# Patient Record
Sex: MALE | URBAN - METROPOLITAN AREA
[De-identification: names, ages, dates, MRNs, and addresses within clinical notes are randomized per-mention and may not be internally consistent; named-entity substitution may affect disease eponyms.]

---

## 2023-06-09 ENCOUNTER — EMPLOYEE HEALTH (OUTPATIENT)
Dept: OTHER | Facility: HOSPITAL | Age: 23
End: 2023-06-09
Attending: PREVENTIVE MEDICINE

## 2023-06-09 DIAGNOSIS — Z00.00 WELLNESS EXAMINATION: Primary | ICD-10-CM

## 2023-06-09 PROCEDURE — 86480 TB TEST CELL IMMUN MEASURE: CPT

## 2023-06-13 LAB
M TB IFN-G CD4+ T-CELLS BLD-ACNC: 0.01 IU/ML
M TB TUBERC IFN-G BLD QL: NEGATIVE
M TB TUBERC IGNF/MITOGEN IGNF CONTROL: >10 IU/ML
QFT TB1 AG MINUS NIL: 0 IU/ML
QFT TB2 AG MINUS NIL: 0 IU/ML

## 2023-09-15 PROBLEM — F41.1 GAD (GENERALIZED ANXIETY DISORDER): Status: ACTIVE | Noted: 2023-09-15

## 2023-09-15 PROBLEM — Z87.890: Status: ACTIVE | Noted: 2023-09-15

## 2023-09-15 PROBLEM — Z82.79 FAMILY HISTORY OF CONGENITAL HEART DISEASE IN SISTER: Status: ACTIVE | Noted: 2023-09-15

## 2023-09-18 ENCOUNTER — TELEPHONE (OUTPATIENT)
Dept: INTERNAL MEDICINE CLINIC | Facility: CLINIC | Age: 23
End: 2023-09-18

## 2023-09-20 ENCOUNTER — TELEPHONE (OUTPATIENT)
Dept: INTERNAL MEDICINE CLINIC | Facility: CLINIC | Age: 23
End: 2023-09-20

## 2023-09-20 DIAGNOSIS — Z13.0 SCREENING FOR DISORDER OF BLOOD AND BLOOD-FORMING ORGANS: ICD-10-CM

## 2023-09-20 DIAGNOSIS — Z13.228 SCREENING FOR METABOLIC DISORDER: ICD-10-CM

## 2023-09-20 DIAGNOSIS — Z00.00 ROUTINE GENERAL MEDICAL EXAMINATION AT A HEALTH CARE FACILITY: Primary | ICD-10-CM

## 2023-09-20 DIAGNOSIS — Z13.220 SCREENING FOR LIPID DISORDERS: ICD-10-CM

## 2023-09-20 DIAGNOSIS — Z13.29 SCREENING FOR THYROID DISORDER: ICD-10-CM

## 2023-09-20 NOTE — TELEPHONE ENCOUNTER
Future Appointments   Date Time Provider Elvis Alla   10/18/2023 11:30 AM Sarwat Caldwell PA-C [facility]     Informed must fast no call back required.  Orders to Bakari Albarran

## 2023-10-18 ENCOUNTER — OFFICE VISIT (OUTPATIENT)
Dept: INTERNAL MEDICINE CLINIC | Facility: CLINIC | Age: 23
End: 2023-10-18
Payer: COMMERCIAL

## 2023-10-18 ENCOUNTER — OFFICE VISIT (OUTPATIENT)
Facility: CLINIC | Age: 23
End: 2023-10-18
Payer: COMMERCIAL

## 2023-10-18 VITALS
BODY MASS INDEX: 27.49 KG/M2 | HEART RATE: 73 BPM | SYSTOLIC BLOOD PRESSURE: 102 MMHG | DIASTOLIC BLOOD PRESSURE: 60 MMHG | WEIGHT: 165 LBS | OXYGEN SATURATION: 98 % | HEIGHT: 65 IN | RESPIRATION RATE: 18 BRPM

## 2023-10-18 VITALS
RESPIRATION RATE: 16 BRPM | BODY MASS INDEX: 26.33 KG/M2 | OXYGEN SATURATION: 100 % | HEART RATE: 70 BPM | WEIGHT: 158 LBS | HEIGHT: 65 IN | DIASTOLIC BLOOD PRESSURE: 64 MMHG | SYSTOLIC BLOOD PRESSURE: 116 MMHG

## 2023-10-18 DIAGNOSIS — L65.9 HAIR LOSS: ICD-10-CM

## 2023-10-18 DIAGNOSIS — F41.1 GAD (GENERALIZED ANXIETY DISORDER): ICD-10-CM

## 2023-10-18 DIAGNOSIS — Z87.890 TRANSGENDER WITH HISTORY OF GENDER AFFIRMATION SURGERY: Primary | ICD-10-CM

## 2023-10-18 DIAGNOSIS — Z12.4 CERVICAL CANCER SCREENING: ICD-10-CM

## 2023-10-18 DIAGNOSIS — R06.83 SNORING: ICD-10-CM

## 2023-10-18 DIAGNOSIS — Z11.3 ROUTINE SCREENING FOR STI (SEXUALLY TRANSMITTED INFECTION): ICD-10-CM

## 2023-10-18 DIAGNOSIS — M54.2 NECK PAIN: ICD-10-CM

## 2023-10-18 DIAGNOSIS — M54.9 UPPER BACK PAIN: ICD-10-CM

## 2023-10-18 DIAGNOSIS — Z00.00 ANNUAL PHYSICAL EXAM: Primary | ICD-10-CM

## 2023-10-18 PROCEDURE — 87591 N.GONORRHOEAE DNA AMP PROB: CPT | Performed by: PHYSICIAN ASSISTANT

## 2023-10-18 PROCEDURE — 87491 CHLMYD TRACH DNA AMP PROBE: CPT | Performed by: PHYSICIAN ASSISTANT

## 2023-10-18 PROCEDURE — 3008F BODY MASS INDEX DOCD: CPT | Performed by: PHYSICIAN ASSISTANT

## 2023-10-18 PROCEDURE — 3008F BODY MASS INDEX DOCD: CPT | Performed by: STUDENT IN AN ORGANIZED HEALTH CARE EDUCATION/TRAINING PROGRAM

## 2023-10-18 PROCEDURE — 3074F SYST BP LT 130 MM HG: CPT | Performed by: PHYSICIAN ASSISTANT

## 2023-10-18 PROCEDURE — 3074F SYST BP LT 130 MM HG: CPT | Performed by: STUDENT IN AN ORGANIZED HEALTH CARE EDUCATION/TRAINING PROGRAM

## 2023-10-18 PROCEDURE — 3078F DIAST BP <80 MM HG: CPT | Performed by: STUDENT IN AN ORGANIZED HEALTH CARE EDUCATION/TRAINING PROGRAM

## 2023-10-18 PROCEDURE — 99204 OFFICE O/P NEW MOD 45 MIN: CPT | Performed by: STUDENT IN AN ORGANIZED HEALTH CARE EDUCATION/TRAINING PROGRAM

## 2023-10-18 PROCEDURE — 88175 CYTOPATH C/V AUTO FLUID REDO: CPT | Performed by: PHYSICIAN ASSISTANT

## 2023-10-18 PROCEDURE — 3078F DIAST BP <80 MM HG: CPT | Performed by: PHYSICIAN ASSISTANT

## 2023-10-18 PROCEDURE — 99395 PREV VISIT EST AGE 18-39: CPT | Performed by: PHYSICIAN ASSISTANT

## 2023-10-18 RX ORDER — ESCITALOPRAM OXALATE 5 MG/1
5 TABLET ORAL DAILY
Qty: 90 TABLET | Refills: 0 | Status: SHIPPED | OUTPATIENT
Start: 2023-10-18

## 2023-10-18 RX ORDER — TESTOSTERONE CYPIONATE 200 MG/ML
INJECTION, SOLUTION INTRAMUSCULAR
Qty: 10 ML | Refills: 3 | Status: SHIPPED | OUTPATIENT
Start: 2023-10-18

## 2023-10-18 NOTE — PATIENT INSTRUCTIONS
Please get your blood work done for me 8AM and fasting on Wednesday (assisted between your injections). I refilled your testosterone, please continue the same dose for now and let me know if they give you any issues at the pharmacy. Depending on your blood work, I'll let you know if we need to change anything. Whenever you want to discuss bottom surgery let me know, and I will look into referring you to a more experienced center.      Return Visit   [X] Physician in 6 months  [  ] After visit summary   [X] Fasting/8AM labs  [  ] Central scheduling # for ultrasound/nuclear med/CT/MRI/DXA  [  ] Directions to 1st floor lab to collect urine collection jug/salivary cortisol tubes  [  ] Med rep info for:  [  ] Dental clearance form  [  ] Will need authorization for outside records  [  ] Give blood sugar log  [  ] Other:

## 2023-10-19 LAB
C TRACH DNA SPEC QL NAA+PROBE: NEGATIVE
N GONORRHOEA DNA SPEC QL NAA+PROBE: NEGATIVE

## 2023-10-24 LAB
.: NORMAL
.: NORMAL

## 2023-11-01 ENCOUNTER — LAB ENCOUNTER (OUTPATIENT)
Dept: LAB | Age: 23
End: 2023-11-01
Attending: PHYSICIAN ASSISTANT
Payer: COMMERCIAL

## 2023-11-01 DIAGNOSIS — R79.89 ABNORMAL TSH: Primary | ICD-10-CM

## 2023-11-01 DIAGNOSIS — Z00.00 ROUTINE GENERAL MEDICAL EXAMINATION AT A HEALTH CARE FACILITY: ICD-10-CM

## 2023-11-01 DIAGNOSIS — Z13.29 SCREENING FOR THYROID DISORDER: ICD-10-CM

## 2023-11-01 DIAGNOSIS — Z87.890 TRANSGENDER WITH HISTORY OF GENDER AFFIRMATION SURGERY: ICD-10-CM

## 2023-11-01 DIAGNOSIS — Z13.220 SCREENING FOR LIPID DISORDERS: ICD-10-CM

## 2023-11-01 DIAGNOSIS — Z13.0 SCREENING FOR DISORDER OF BLOOD AND BLOOD-FORMING ORGANS: ICD-10-CM

## 2023-11-01 DIAGNOSIS — R79.89 ABNORMAL TSH: ICD-10-CM

## 2023-11-01 DIAGNOSIS — Z13.228 SCREENING FOR METABOLIC DISORDER: ICD-10-CM

## 2023-11-01 LAB
ALBUMIN SERPL-MCNC: 4.3 G/DL (ref 3.4–5)
ALBUMIN/GLOB SERPL: 1.2 {RATIO} (ref 1–2)
ALP LIVER SERPL-CCNC: 54 U/L
ALT SERPL-CCNC: 31 U/L
ANION GAP SERPL CALC-SCNC: 6 MMOL/L (ref 0–18)
AST SERPL-CCNC: 17 U/L (ref 15–37)
BASOPHILS # BLD AUTO: 0.06 X10(3) UL (ref 0–0.2)
BASOPHILS NFR BLD AUTO: 1.2 %
BILIRUB SERPL-MCNC: 0.7 MG/DL (ref 0.1–2)
BUN BLD-MCNC: 12 MG/DL (ref 9–23)
CALCIUM BLD-MCNC: 9.3 MG/DL (ref 8.5–10.1)
CHLORIDE SERPL-SCNC: 105 MMOL/L (ref 98–112)
CHOLEST SERPL-MCNC: 130 MG/DL (ref ?–200)
CO2 SERPL-SCNC: 28 MMOL/L (ref 21–32)
CREAT BLD-MCNC: 1.09 MG/DL
EGFRCR SERPLBLD CKD-EPI 2021: 97 ML/MIN/1.73M2 (ref 60–?)
EOSINOPHIL # BLD AUTO: 0.13 X10(3) UL (ref 0–0.7)
EOSINOPHIL NFR BLD AUTO: 2.7 %
ERYTHROCYTE [DISTWIDTH] IN BLOOD BY AUTOMATED COUNT: 12.1 %
FASTING PATIENT LIPID ANSWER: YES
FASTING STATUS PATIENT QL REPORTED: YES
GLOBULIN PLAS-MCNC: 3.5 G/DL (ref 2.8–4.4)
GLUCOSE BLD-MCNC: 89 MG/DL (ref 70–99)
HCT VFR BLD AUTO: 46.4 %
HDLC SERPL-MCNC: 40 MG/DL (ref 40–59)
HGB BLD-MCNC: 15.3 G/DL
IMM GRANULOCYTES # BLD AUTO: 0.01 X10(3) UL (ref 0–1)
IMM GRANULOCYTES NFR BLD: 0.2 %
LDLC SERPL CALC-MCNC: 76 MG/DL (ref ?–100)
LYMPHOCYTES # BLD AUTO: 1.52 X10(3) UL (ref 1–4)
LYMPHOCYTES NFR BLD AUTO: 31.4 %
MCH RBC QN AUTO: 30.7 PG (ref 26–34)
MCHC RBC AUTO-ENTMCNC: 33 G/DL (ref 31–37)
MCV RBC AUTO: 93 FL
MONOCYTES # BLD AUTO: 0.37 X10(3) UL (ref 0.1–1)
MONOCYTES NFR BLD AUTO: 7.6 %
NEUTROPHILS # BLD AUTO: 2.75 X10 (3) UL (ref 1.5–7.7)
NEUTROPHILS # BLD AUTO: 2.75 X10(3) UL (ref 1.5–7.7)
NEUTROPHILS NFR BLD AUTO: 56.9 %
NONHDLC SERPL-MCNC: 90 MG/DL (ref ?–130)
OSMOLALITY SERPL CALC.SUM OF ELEC: 287 MOSM/KG (ref 275–295)
PLATELET # BLD AUTO: 255 10(3)UL (ref 150–450)
POTASSIUM SERPL-SCNC: 4.3 MMOL/L (ref 3.5–5.1)
PROT SERPL-MCNC: 7.8 G/DL (ref 6.4–8.2)
RBC # BLD AUTO: 4.99 X10(6)UL
SODIUM SERPL-SCNC: 139 MMOL/L (ref 136–145)
T3 SERPL-MCNC: 94 NG/DL (ref 60–181)
T3FREE SERPL-MCNC: 3 PG/ML (ref 2.4–4.2)
T4 FREE SERPL-MCNC: 0.7 NG/DL (ref 0.8–1.7)
TESTOST SERPL-MCNC: 592.3 NG/DL
THYROPEROXIDASE AB SERPL-ACNC: 732 U/ML (ref ?–60)
TRIGL SERPL-MCNC: 68 MG/DL (ref 30–149)
TSI SER-ACNC: 5.45 MIU/ML (ref 0.36–3.74)
VLDLC SERPL CALC-MCNC: 11 MG/DL (ref 0–30)
WBC # BLD AUTO: 4.8 X10(3) UL (ref 4–11)

## 2023-11-01 PROCEDURE — 85025 COMPLETE CBC W/AUTO DIFF WBC: CPT

## 2023-11-01 PROCEDURE — 84439 ASSAY OF FREE THYROXINE: CPT

## 2023-11-01 PROCEDURE — 84443 ASSAY THYROID STIM HORMONE: CPT

## 2023-11-01 PROCEDURE — 84481 FREE ASSAY (FT-3): CPT

## 2023-11-01 PROCEDURE — 36415 COLL VENOUS BLD VENIPUNCTURE: CPT

## 2023-11-01 PROCEDURE — 80053 COMPREHEN METABOLIC PANEL: CPT

## 2023-11-01 PROCEDURE — 80061 LIPID PANEL: CPT

## 2023-11-01 PROCEDURE — 86376 MICROSOMAL ANTIBODY EACH: CPT

## 2023-11-01 PROCEDURE — 84480 ASSAY TRIIODOTHYRONINE (T3): CPT

## 2023-11-01 PROCEDURE — 84403 ASSAY OF TOTAL TESTOSTERONE: CPT

## 2023-11-04 ENCOUNTER — PATIENT MESSAGE (OUTPATIENT)
Facility: CLINIC | Age: 23
End: 2023-11-04

## 2023-11-06 NOTE — TELEPHONE ENCOUNTER
From: Scooter Wynn  To: Kimberly Coreas  Sent: 11/4/2023 9:25 AM CDT  Subject: Hypothyroidism and Eloy Richardson Dr. Carley Shore,     I have reviewed the lab results and am wondering if there is a test to determine iodine levels. I cook exclusively with sea salt and am wondering if a change to iodized salt would be a solution to my hypothyroidism?     Thank you very much,   Tracey Espinal

## 2023-11-06 NOTE — TELEPHONE ENCOUNTER
Note from Dr. Alina Cutler: Iodine deficiency is extraordinarily rare in the [place] due to fortification of our food supply with iodine (like breads, cereal, milk) and it generally tends to present with a large goiter, which he does not have. His hypothyroidism is likely related to Hashimoto's due to the positive thyroid peroxidase antibody, which is diagnostic. He can certainly switch to iodized salt, but it is unlikely that he would resolve the hypothyroidism by doing so. We generally do not check iodine blood levels or urine levels in patients who have not been treated with radioactive iodine, particular as he lacks a goiter. My recommendation would still be to start the levothyroxine, but if he wants to hold off on that and try the iodized salt and recheck his levels in about six weeks to see if things improve, we can do that. Mild Hashimoto's itself can also be somewhat waxing and waning so if his levels improve in six weeks, we may not be able to clearly attribute that to increased dietary iodine as it could just be the Hashimoto's calming down for a bit. Please let me know what he'd like to do and we can go from there! [facility] sent to patient with response.

## 2023-11-06 NOTE — TELEPHONE ENCOUNTER
[facility] routed for review.     Component      Latest Ref Rng 11/1/2023   TSH      0.358 - 3.740 mIU/mL 5.450 (H)      Component      Latest Ref Rng 11/1/2023   T4,Free (Direct)      0.8 - 1.7 ng/dL 0.7 (L)    ANTI-THYROPEROXIDASE      <60 U/mL 732 (H)    T3 TOTAL      60 - 181 ng/dL 94

## 2023-11-09 NOTE — TELEPHONE ENCOUNTER
Should be ordered by Dr. Sylvester who manages his testosterone (for which the needles are needed)

## 2023-12-04 ENCOUNTER — TELEPHONE (OUTPATIENT)
Dept: PHYSICAL THERAPY | Facility: HOSPITAL | Age: 23
End: 2023-12-04

## 2023-12-06 ENCOUNTER — OFFICE VISIT (OUTPATIENT)
Dept: PHYSICAL THERAPY | Facility: HOSPITAL | Age: 23
End: 2023-12-06
Attending: PHYSICIAN ASSISTANT
Payer: COMMERCIAL

## 2023-12-06 DIAGNOSIS — M54.2 NECK PAIN: Primary | ICD-10-CM

## 2023-12-06 DIAGNOSIS — M54.9 UPPER BACK PAIN: ICD-10-CM

## 2023-12-06 PROCEDURE — 97110 THERAPEUTIC EXERCISES: CPT

## 2023-12-06 PROCEDURE — 97162 PT EVAL MOD COMPLEX 30 MIN: CPT

## 2023-12-13 ENCOUNTER — OFFICE VISIT (OUTPATIENT)
Dept: PHYSICAL THERAPY | Facility: HOSPITAL | Age: 23
End: 2023-12-13
Attending: PHYSICIAN ASSISTANT
Payer: COMMERCIAL

## 2023-12-13 PROCEDURE — 97140 MANUAL THERAPY 1/> REGIONS: CPT

## 2023-12-13 PROCEDURE — 97110 THERAPEUTIC EXERCISES: CPT

## 2023-12-13 NOTE — PROGRESS NOTES
Diagnosis:   Neck pain; Upper back pain         Referring Provider: Ophelia Ceja  Date of Evaluation:    12/6/23    Precautions:  None Next MD visit:   none scheduled  Date of Surgery: n/a   Insurance Primary/Secondary: [place] / N/A     # Auth Visits: 8 per POC            Subjective: Patient states that he is doing overall better than last week, feels that he has slightly more motion and less pain, but pain and limited mobility still present overall. He has been working on [employer] without issue, also has been using lacrosse ball as trigger pt release on UT and that seems to be helping as well. Pain: 2/10      Objective:   (12/13/23):  PROM shoulder flexion: 139 deg      Assessment: Patient continues to present with fwd tilted shoulder on R compared to L, increased mm tension UT, anterior and posterior shoulder, but no increasing pain to STM and other palpation. Able to progress to light scap and RC strengthening without increased pain, reported mild soreness, CP at end of session to decrease likelihood of DOMS. Added YTB flexion with posterior shoulder recruitment due to good ability to perform in session without increasing pain. Goals: (To be met in 8 visits)   Patient will improve R shoulder flexion AROM to at least 160 deg without pain to improve ability to reach [facility] without restriction. Patient will improve R shoulder ER AROM to at least 85 deg without pain to improve ability to reach behind head and dress without restriction. Patient will improve R shoulder strength to 5/5 throughout to improve ability to lift gallon of milk from fridge without restriction. Patient will report improved ability to sleep on both sides at night. Patient will be IND and compliant in HEP to maintain progress made in PT. Plan: Continue PT with progression as indicated. Date: 12/13/2023  TX#: 2/8 Date:                 TX#: 3/ Date:                 TX#: 4/ Date:                 TX#: 5/ Date:    Tx#: 6/   Therex  - UBE L2 retro x 4 min  - prone scap retract with VAHID ext, 0# x 10  - prone reverse fly VAHID, 0# x 10  - prone row, 4# VAHID 2 x 10  - SL shoulder abd to 85 deg, 2# 2 x 10  - SL ER with towel roll, 2# 2 x 20  - flexion pulleys x 2 min  - standing VAHID YTB ER into OH flexion 2 x 5       Manual   - STM R shoulder  - R shoulder oscillations  - PROM R shoulder flexion, abd, ER, IR  - inferior glides R GHJ, gr II/III       -       CP R shoulder x 10 min (no charge)       HEP: seated shoulder flexion slides, SL ER, doorway pec stretch, scap retract, YTB VAHID ER into OH flexion     Charges: Manual x 1 (20'), Therex x 2 (25')       Total Timed Treatment: 45 min  Total Treatment Time: 55 min

## 2023-12-18 ENCOUNTER — APPOINTMENT (OUTPATIENT)
Dept: PHYSICAL THERAPY | Facility: HOSPITAL | Age: 23
End: 2023-12-18
Attending: PHYSICIAN ASSISTANT
Payer: COMMERCIAL

## 2023-12-19 ENCOUNTER — OFFICE VISIT (OUTPATIENT)
Facility: LOCATION | Age: 23
End: 2023-12-19
Payer: COMMERCIAL

## 2023-12-19 DIAGNOSIS — G47.33 OBSTRUCTIVE SLEEP APNEA SYNDROME: Primary | ICD-10-CM

## 2023-12-19 DIAGNOSIS — J35.1 TONSILLAR HYPERTROPHY: ICD-10-CM

## 2023-12-19 PROCEDURE — 99203 OFFICE O/P NEW LOW 30 MIN: CPT | Performed by: OTOLARYNGOLOGY

## 2023-12-19 NOTE — PROGRESS NOTES
Jenn Willard is a 20year old adult. Chief Complaint   Patient presents with    Snoring     HPI:   He has a history of snoring. Times he will awaken not well rested. He denies nasal obstruction. He is status post adenoidectomy and ear tubes as a child. He denies chronic tonsillitis. He denies allergies. Current Outpatient Medications   Medication Sig Dispense Refill    levothyroxine 75 MCG Oral Tab Take 1 tablet (75 mcg total) by mouth before breakfast. 90 tablet 1    testosterone cypionate 200 mg/mL Intramuscular Solution INJECT 0.30 ML IN THE MUSCLE OR UNDER THE SKIN ONCE A WEEK. 10 mL 3    escitalopram 5 MG Oral Tab Take 1 tablet (5 mg total) by mouth daily. 90 tablet 0    Needle, Disp, 18G X 1-1/2\" Does not apply Misc       MULTIPLE VITAMIN IV Take 1 tablet by mouth daily. History reviewed. No pertinent past medical history. Social History:  Social History     Socioeconomic History    Marital status: Single   Tobacco Use    Smoking status: Never    Smokeless tobacco: Never   Substance and Sexual Activity    Drug use: Not Currently    Sexual activity: Yes      Past Surgical History:   Procedure Laterality Date    MASTECTOMY      bilteral    TONSILLECTOMY           REVIEW OF SYSTEMS:   GENERAL HEALTH: feels well otherwise  GENERAL : denies fever, chills, sweats, weight loss, weight gain  SKIN: denies any unusual skin lesions or rashes  RESPIRATORY: denies shortness of breath with exertion  NEURO: denies headaches    EXAM:   There were no vitals taken for this visit. System Findings Details   Constitutional  Overall appearance - Normal.   Psychiatric  Orientation - Oriented to time, place, person & situation. Appropriate mood and affect.    Head/Face  Facial features -- Normal. Skull - Normal.   Eyes  Pupils equal ,round ,react to light and accomidate   Ears, Nose, Throat, Neck  Ears clear nose mild septal deviation left oral cavity clear oropharynx large +3/4 tonsils with pharyngeal larynx narrowing indirect laryngoscopy does show the base of tongue sits posteriorly neck no masses   Neurological  Memory - Normal. Cranial nerves - Cranial nerves II through XII grossly intact. Lymph Detail  Submental. Submandibular. Anterior cervical. Posterior cervical. Supraclavicular. ASSESSMENT AND PLAN:   1. Obstructive sleep apnea syndrome  Given the chronic snoring and sleeping issues he very well may have sleep apnea. He will undergo a dedicated sleep study and we will speak after the above. We have discussed different forms of treatment for snoring including mandible repositioning devices versus CPAP therapy versus surgery. - At Home Sleep Test - Non-Medicare    2. Tonsillar hypertrophy  He does have large tonsils which are likely contributing to the overall upper airway obstruction. The patient indicates understanding of these issues and agrees to the plan. No follow-ups on file.     Maya Botello MD  12/19/2023  5:58 PM

## 2023-12-20 ENCOUNTER — OFFICE VISIT (OUTPATIENT)
Dept: PHYSICAL THERAPY | Facility: HOSPITAL | Age: 23
End: 2023-12-20
Attending: PHYSICIAN ASSISTANT
Payer: COMMERCIAL

## 2023-12-20 PROCEDURE — 97140 MANUAL THERAPY 1/> REGIONS: CPT

## 2023-12-20 PROCEDURE — 97110 THERAPEUTIC EXERCISES: CPT

## 2023-12-20 NOTE — PROGRESS NOTES
Diagnosis:   Neck pain; Upper back pain         Referring Provider: Dania Nelson  Date of Evaluation:    12/6/23    Precautions:  None Next MD visit:   none scheduled  Date of Surgery: n/a   Insurance Primary/Secondary: [place] / N/A     # Auth Visits: 8 per POC            Subjective: Patient states that his R shoulder is doing better, pain not resolved, but less severe and more mobility, however, since last week, his L shoulder has started hurting more than the R. Not sure what has been causing this, but it is the same pain as the R, into the collarbone kendall with reaching across and fwd reaching in front of him. Has been doing his exercises bilaterally, no other changes, not sure if this is related. Pain: R 1/10; L 2-3/10      Objective:   (12/13/23):  PROM shoulder flexion: 139 deg    (12/20/23): Accessory motion:   Cervical: hypomobile at central and L C4, no radiating pain or reproduction into shoulders or other    Thoracic: hypomobile T1-8 central and VAHID unilateral with local pain reported, T4-5 radiating towards scap VAHID, no recreation of clavicle pain   Clavicle: posterior and inferior glides WNL mild pain with inferior glide on L, kendall if held in inferior glide with shoulder flexion and abd   Palpation: pec major insertion on clavicle very TTP on L and also recreating pain with movement      Assessment: Unable to recreate shoulder or clavicle pain with cervical assessment/mobilization/provocation, thoracic also not recreating, however with notable hypomobility T1-8 central and VAHID unilateral, would be appropriate to address. Clavicle pain possibly related to TTP at pec major insertion, patient to hold on pec major stretch this week and address thoracic mobility, will f/u again next week on VAHID pain. Goals: (To be met in 8 visits)   Patient will improve R shoulder flexion AROM to at least 160 deg without pain to improve ability to reach [facility] without restriction.   Patient will improve R shoulder ER AROM to at least 85 deg without pain to improve ability to reach behind head and dress without restriction. Patient will improve R shoulder strength to 5/5 throughout to improve ability to lift gallon of milk from fridge without restriction. Patient will report improved ability to sleep on both sides at night. Patient will be IND and compliant in HEP to maintain progress made in PT. Plan: Continue PT with progression as indicated. F/u on L-sided pain and addition of thoracic extension   Date: 12/13/2023  TX#: 2/8 Date: 12/20/23               TX#: 3/8 Date:                 TX#: 4/ Date:                 TX#: 5/ Date:    Tx#: 6/   Therex  - UBE L2 retro x 4 min  - prone scap retract with VAHID ext, 0# x 10  - prone reverse fly VAHID, 0# x 10  - prone row, 4# VAHID 2 x 10  - SL shoulder abd to 85 deg, 2# 2 x 10  - SL ER with towel roll, 2# 2 x 20  - flexion pulleys x 2 min  - standing VAHID YTB ER into OH flexion 2 x 5 Therex  - UBE L2 retro x 4 min  - continued assessment as needed and listed   - prone scap retract with VAHID ext, 0# x 10  - prone reverse fly VAIHD, 0# x 10  - thoracic extension over 1/2 foam roll, multiple bouts at 2 levels         Manual   - STM R shoulder  - R shoulder oscillations  - PROM R shoulder flexion, abd, ER, IR  - inferior glides R GHJ, gr II/III Manual   - STM R/L shoulder, L pec major insertion along clavicle   - R/L shoulder oscillations  - PROM R/L shoulder flexion, abd, ER, IR  - inferior glides R/L GHJ, gr II/III  - inferior mobs of L clavicle with AROM L shoulder flexion and add      - -      CP R shoulder x 10 min (no charge) Icing at home      HEP: seated shoulder flexion slides, SL ER, doorway pec stretch (hold as of 12/20/23), scap retract, YTB VAHID ER into OH flexion, thoracic ext over foam roll     Charges: Manual x 1 (25'), Therex x 2 (20')       Total Timed Treatment: 45 min  Total Treatment Time: 55 min

## 2023-12-27 ENCOUNTER — OFFICE VISIT (OUTPATIENT)
Dept: PHYSICAL THERAPY | Facility: HOSPITAL | Age: 23
End: 2023-12-27
Attending: PHYSICIAN ASSISTANT
Payer: COMMERCIAL

## 2023-12-27 PROCEDURE — 97140 MANUAL THERAPY 1/> REGIONS: CPT

## 2023-12-27 PROCEDURE — 97110 THERAPEUTIC EXERCISES: CPT

## 2023-12-27 NOTE — PROGRESS NOTES
Diagnosis:   Neck pain; Upper back pain         Referring Provider: Candice Donovan  Date of Evaluation:    12/6/23    Precautions:  None Next MD visit:   none scheduled  Date of Surgery: n/a   Insurance Primary/Secondary: [place] / N/A     # Auth Visits: 8 per POC            Subjective: Patient states that he is doing better than last week, but progress is slow. He still feels stiff and there is still pain. He thinks that this is complicated by the nature of his job, he has to stay in aggravating positions at work and there is no way to modify this. He notices that there is pain in the clavicle on the R especially in the morning with reaching forward to grab his clothes. L side still aching and tight, but not nearly as it was last week. Pain: R 2/10; L 1/10      Objective:   (12/13/23):  PROM shoulder flexion: 139 deg    (12/20/23): Accessory motion:   Cervical: hypomobile at central and L C4, no radiating pain or reproduction into shoulders or other    Thoracic: hypomobile T1-8 central and VAHID unilateral with local pain reported, T4-5 radiating towards scap VAHID, no recreation of clavicle pain   Clavicle: posterior and inferior glides WNL mild pain with inferior glide on L, kendall if held in inferior glide with shoulder flexion and abd   Palpation: pec major insertion on clavicle very TTP on L and also recreating pain with movement    (12/27/23): Shoulder AROM: (* denotes performed with pain)  Flexion: R 150 deg*; L 170 deg   Abduction: R 130 deg*; L 175 deg   ER: R 73 deg*; L 90 deg  IR:not reassessed this date      Strength: (* denotes performed with pain)  UE/Scapular   Shoulder Flex: R 4+/5*, L 5/5  Shoulder ABD (C5): R 4+/5*, L 5/5  Shoulder ER: R 4/5*, L 5/5          Assessment: Patient continues to report pain and limited VAHID shoulder mobility, performed AROM reassessment and while is still limited from full AROM on R, there is improvement with flexion, abd, and ER.  This is also true of resisted strength testing with improved ability to resist without increasing pain, however, strength is not yet full on R. Initial mobility exercises are painful and restricted throughout, however, with reps, is able to improve feeling of fluidity of motion, should try general AROM flexion and abd in the morning before dressing, etc to improve blood flow. Updated HEP to address mobility issues, strength deficits, and thoracic mobility as listed below. Will continue to progress within tolerance. Goals: (To be met in 8 visits)   Patient will improve R shoulder flexion AROM to at least 160 deg without pain to improve ability to reach [facility] without restriction. - progress  Patient will improve R shoulder ER AROM to at least 85 deg without pain to improve ability to reach behind head and dress without restriction. - progress  Patient will improve R shoulder strength to 5/5 throughout to improve ability to lift gallon of milk from fridge without restriction. - progress  Patient will report improved ability to sleep on both sides at night. - progress  Patient will be IND and compliant in HEP to maintain progress made in PT. - issued and progressed     Plan: Continue PT with progression as indicated. F/u on updated HEP. Date: 12/13/2023  TX#: 2/8 Date: 12/20/23               TX#: 3/8 Date: 12/27/23                TX#: 4/8 Date:                 TX#: 5/ Date:    Tx#: 6/   Therex  - UBE L2 retro x 4 min  - prone scap retract with VAHID ext, 0# x 10  - prone reverse fly VAHID, 0# x 10  - prone row, 4# VAHID 2 x 10  - SL shoulder abd to 85 deg, 2# 2 x 10  - SL ER with towel roll, 2# 2 x 20  - flexion pulleys x 2 min  - standing VAHID YTB ER into OH flexion 2 x 5 Therex  - UBE L2 retro x 4 min  - continued assessment as needed and listed   - prone scap retract with VAHID ext, 0# x 10  - prone reverse fly VAHID, 0# x 10  - thoracic extension over 1/2 foam roll, multiple bouts at 2 levels    Therex  - SB ROM on wall x 10  - thoracic extension over 1/2 foam roll, multiple bouts at 2 levels   - doorway ER stretch, 3 x 20 sec R/L  - TRX flexion walkout stretch, 10 sec hold x 5  - standing against wall with ball at mid-thoracic, AROM VAHID flexion x 10; with hands behind head butterflies x 10  - YTB ER with towel roll 2 x 10 R/L   - GTB rows x 15     Manual   - STM R shoulder  - R shoulder oscillations  - PROM R shoulder flexion, abd, ER, IR  - inferior glides R GHJ, gr II/III Manual   - STM R/L shoulder, L pec major insertion along clavicle   - R/L shoulder oscillations  - PROM R/L shoulder flexion, abd, ER, IR  - inferior glides R/L GHJ, gr II/III  - inferior mobs of L clavicle with AROM L shoulder flexion and add Manual   - STM R shoulder  - PROM R shoulder flexion, abd  - inferior glides R GHJ, gr II/III  - C-PA mobs T2-8, gr II/III     - - -     CP R shoulder x 10 min (no charge) Icing at home Icing at home     HEP: shoulder flexion slides on wall, doorway ER stretch, thoracic extension over foam roll, YTB ER with towel roll, GTB rows     Charges: Manual x 1 (20'), Therex x 2 (30')       Total Timed Treatment: 50 min  Total Treatment Time: 50 min

## 2024-01-03 ENCOUNTER — PATIENT MESSAGE (OUTPATIENT)
Dept: PHYSICAL THERAPY | Age: 24
End: 2024-01-03

## 2024-01-03 ENCOUNTER — OFFICE VISIT (OUTPATIENT)
Dept: PHYSICAL THERAPY | Facility: HOSPITAL | Age: 24
End: 2024-01-03
Attending: PHYSICIAN ASSISTANT
Payer: COMMERCIAL

## 2024-01-03 PROCEDURE — 97140 MANUAL THERAPY 1/> REGIONS: CPT

## 2024-01-03 PROCEDURE — 97110 THERAPEUTIC EXERCISES: CPT

## 2024-01-03 NOTE — PROGRESS NOTES
Diagnosis:   Neck pain; Upper back pain         Referring Provider: Jose Juan  Date of Evaluation:    12/6/23    Precautions:  None Next MD visit:   none scheduled  Date of Surgery: n/a   Insurance Primary/Secondary: [employer] / N/A     # Auth Visits: 8 per POC            Subjective: Patient states that this week is better than last, he is feeling that he is moving in the right direction, but pain is not completely resolved. There is still tension and tightness in the shoulders and chest, but not the same intense pain on either side. He noticed that pain with reaching forward on the R has been decreasing in the morning with picking out clothes, only happened one time to his recall.     Pain: not asked today      Objective:   (12/13/23):  PROM shoulder flexion: 139 deg    (12/20/23):  Accessory motion:   Cervical: hypomobile at central and L C4, no radiating pain or reproduction into shoulders or other    Thoracic: hypomobile T1-8 central and VAHID unilateral with local pain reported, T4-5 radiating towards scap VAHID, no recreation of clavicle pain   Clavicle: posterior and inferior glides WNL mild pain with inferior glide on L, kendall if held in inferior glide with shoulder flexion and abd   Palpation: pec major insertion on clavicle very TTP on L and also recreating pain with movement    (12/27/23):  Shoulder AROM: (* denotes performed with pain)  Flexion: R 150 deg*; L 170 deg   Abduction: R 130 deg*; L 175 deg   ER: R 73 deg*; L 90 deg  IR:not reassessed this date      Strength: (* denotes performed with pain)  UE/Scapular   Shoulder Flex: R 4+/5*, L 5/5  Shoulder ABD (C5): R 4+/5*, L 5/5  Shoulder ER: R 4/5*, L 5/5          Assessment: Continues to present with mild increased mm tension VAHID pecs and RC (especially supraspinatus), but diminishing TTP overall. Mild restriction remains with shoulder flexion and ER, but improving, can attain full shoulder abd with mild pain. Increased time spent on scapular and RC  strengthening within session, including progression to weighted cable pulleys. Patient noted feeling of increased mm work by end of session, but no increased pain. Will continue to progress and fade from manual intervention as tolerated.       Goals: (To be met in 8 visits)   Patient will improve R shoulder flexion AROM to at least 160 deg without pain to improve ability to reach OH without restriction. - progress  Patient will improve R shoulder ER AROM to at least 85 deg without pain to improve ability to reach behind head and dress without restriction. - progress  Patient will improve R shoulder strength to 5/5 throughout to improve ability to lift gallon of milk from fridge without restriction. - progress  Patient will report improved ability to sleep on both sides at night. - progress  Patient will be IND and compliant in HEP to maintain progress made in PT. - issued and progressed     Plan: Continue PT with progression as indicated. Attempt to fade from manual next session as able.   Date: 12/13/2023  TX#: 2/8 Date: 12/20/23               TX#: 3/8 Date: 12/27/23                TX#: 4/8 Date: 1/3/24               TX#: 5/8 Date:   Tx#: 6/   Therex  - UBE L2 retro x 4 min  - prone scap retract with VAHID ext, 0# x 10  - prone reverse fly VAHID, 0# x 10  - prone row, 4# VAHID 2 x 10  - SL shoulder abd to 85 deg, 2# 2 x 10  - SL ER with towel roll, 2# 2 x 20  - flexion pulleys x 2 min  - standing VAHID YTB ER into OH flexion 2 x 5 Therex  - UBE L2 retro x 4 min  - continued assessment as needed and listed   - prone scap retract with VAHID ext, 0# x 10  - prone reverse fly VAHID, 0# x 10  - thoracic extension over 1/2 foam roll, multiple bouts at 2 levels    Therex  - SB ROM on wall x 10  - thoracic extension over 1/2 foam roll, multiple bouts at 2 levels   - doorway ER stretch, 3 x 20 sec R/L  - TRX flexion walkout stretch, 10 sec hold x 5  - standing against wall with ball at mid-thoracic, AROM VAHID flexion x 10; with hands  behind head butterflies x 10  - YTB ER with towel roll 2 x 10 R/L   - GTB rows x 15 Therex  - thoracic extension over 1/2 foam roll, multiple bouts at 2 levels   - TRX flexion walkout stretch, 10 sec hold x 8  - standing flex/abd lateral wall arcs x 5 R/L  - weighted cable rows, 20# VAHID (40# total) 2 x 10  - weighted cable VAHID extension, 20# VAHID (40# total) x 10, 25# VAHID (50# total) x 10  - weighted cable VAHID extension, 20# VAHID (40# total) x 10, 15# VAHID (30# total) x 10  - weighted cable LT pull down, 60# total bar 2 x 10    Manual   - STM R shoulder  - R shoulder oscillations  - PROM R shoulder flexion, abd, ER, IR  - inferior glides R GHJ, gr II/III Manual   - STM R/L shoulder, L pec major insertion along clavicle   - R/L shoulder oscillations  - PROM R/L shoulder flexion, abd, ER, IR  - inferior glides R/L GHJ, gr II/III  - inferior mobs of L clavicle with AROM L shoulder flexion and add Manual   - STM R shoulder  - PROM R shoulder flexion, abd  - inferior glides R GHJ, gr II/III  - C-PA mobs T2-8, gr II/III Manual   - STM R/L shoulder, L pec major insertion along clavicle   - R/L shoulder oscillations  - PROM R/L shoulder flexion, abd, ER, IR  - inferior glides R/L GHJ, gr II/III  - inferior mobs of L clavicle with AROM L shoulder flexion and add    - - - -    CP R shoulder x 10 min (no charge) Icing at home Icing at home Icing at home    HEP: shoulder flexion slides on wall, doorway ER stretch, thoracic extension over foam roll, YTB ER with towel roll, GTB rows     Charges: Manual x 1 (20'), Therex x 2 (30')       Total Timed Treatment: 45 min  Total Treatment Time: 45 min

## 2024-01-07 ENCOUNTER — PATIENT MESSAGE (OUTPATIENT)
Facility: LOCATION | Age: 24
End: 2024-01-07

## 2024-01-08 NOTE — TELEPHONE ENCOUNTER
From: Valeriy Mercado  To: Sahil Bettencourt  Sent: 1/7/2024 12:09 PM CST  Subject: Sleep Study    Helayana Bettencourt and team,    I’m wondering how to move forward with a sleep study that will address my snoring and potential sleep apnea?    Thank you for the help!    Alli Mercado

## 2024-01-09 ENCOUNTER — APPOINTMENT (OUTPATIENT)
Dept: PHYSICAL THERAPY | Facility: HOSPITAL | Age: 24
End: 2024-01-09
Attending: PHYSICIAN ASSISTANT
Payer: COMMERCIAL

## 2024-01-09 ENCOUNTER — TELEPHONE (OUTPATIENT)
Dept: PHYSICAL THERAPY | Facility: HOSPITAL | Age: 24
End: 2024-01-09

## 2024-01-09 PROCEDURE — 97140 MANUAL THERAPY 1/> REGIONS: CPT

## 2024-01-09 PROCEDURE — 97110 THERAPEUTIC EXERCISES: CPT

## 2024-01-09 NOTE — PROGRESS NOTES
Diagnosis:   Neck pain; Upper back pain         Referring Provider: Jose Juan  Date of Evaluation:    12/6/23    Precautions:  None Next MD visit:   none scheduled  Date of Surgery: n/a   Insurance Primary/Secondary: [employer] [place] PPO / N/A     # Auth Visits: 8 per POC            Subjective: Patient states that overall, he is feeling better, there is less pain and more mobility, but still restrictions from normal, does feel that he is having pain on both R and L every day, but it is less predictable. Can still have pain on both sides with reaching out in front of him, but this does not consistently happen. He has been having trouble with laying on his L shoulder due to pain with laying on it and with getting up from SL, pain typically does not last longer than 1 minute after moving from painful position.     Pain:   Current 1-2/10; with laying on the L shoulder can increase to 5/10, then would get off of it (lasts for about 1 min after getting off it)      Objective:   (12/13/23):  PROM shoulder flexion: 139 deg    (12/20/23):  Accessory motion:   Cervical: hypomobile at central and L C4, no radiating pain or reproduction into shoulders or other    Thoracic: hypomobile T1-8 central and VAHID unilateral with local pain reported, T4-5 radiating towards scap VAHID, no recreation of clavicle pain   Clavicle: posterior and inferior glides WNL mild pain with inferior glide on L, kendall if held in inferior glide with shoulder flexion and abd   Palpation: pec major insertion on clavicle very TTP on L and also recreating pain with movement    (12/27/23):  Shoulder AROM: (* denotes performed with pain)  Flexion: R 150 deg*; L 170 deg   Abduction: R 130 deg*; L 175 deg   ER: R 73 deg*; L 90 deg  IR:not reassessed this date      Strength: (* denotes performed with pain)  UE/Scapular   Shoulder Flex: R 4+/5*, L 5/5  Shoulder ABD (C5): R 4+/5*, L 5/5  Shoulder ER: R 4/5*, L 5/5      (1/9/24):  Initiated UE WB tasks, no increasing pain, but mild  quick to fatigue      Assessment: Patient was able to progress to UE WB tasks including on compliant surfaces in quadruped and plank positions, mod quick to fatigue, but not increasing pain. Continue to also work on RC/scap stability VAHID to improve ability to perform full ROM and fwd reaching tasks more consistently without pain. Noted instance of sharp pain anterior shoulder R with 90/90 ER, did not recur following, updated in HEP. Mild median nerve tension improved with passive median nerve sliders.       Goals: (To be met in 8 visits)   Patient will improve R shoulder flexion AROM to at least 160 deg without pain to improve ability to reach OH without restriction. - progress  Patient will improve R shoulder ER AROM to at least 85 deg without pain to improve ability to reach behind head and dress without restriction. - progress  Patient will improve R shoulder strength to 5/5 throughout to improve ability to lift gallon of milk from fridge without restriction. - progress  Patient will report improved ability to sleep on both sides at night. - progress  Patient will be IND and compliant in HEP to maintain progress made in PT. - issued and progressed     Plan: Continue PT with progression as indicated.   Date: 12/13/2023  TX#: 2/8 Date: 12/20/23               TX#: 3/8 Date: 12/27/23                TX#: 4/8 Date: 1/3/24               TX#: 5/8 Date: 1/9/23  Tx#: 6/8   Therex  - UBE L2 retro x 4 min  - prone scap retract with VAHID ext, 0# x 10  - prone reverse fly VAHID, 0# x 10  - prone row, 4# VAHID 2 x 10  - SL shoulder abd to 85 deg, 2# 2 x 10  - SL ER with towel roll, 2# 2 x 20  - flexion pulleys x 2 min  - standing VAHID YTB ER into OH flexion 2 x 5 Therex  - UBE L2 retro x 4 min  - continued assessment as needed and listed   - prone scap retract with VAHID ext, 0# x 10  - prone reverse fly VAHID, 0# x 10  - thoracic extension over 1/2 foam roll, multiple bouts at 2 levels    Therex  - SB ROM on wall x 10  - thoracic  extension over 1/2 foam roll, multiple bouts at 2 levels   - doorway ER stretch, 3 x 20 sec R/L  - TRX flexion walkout stretch, 10 sec hold x 5  - standing against wall with ball at mid-thoracic, AROM VAHID flexion x 10; with hands behind head butterflies x 10  - YTB ER with towel roll 2 x 10 R/L   - GTB rows x 15 Therex  - thoracic extension over 1/2 foam roll, multiple bouts at 2 levels   - TRX flexion walkout stretch, 10 sec hold x 8  - standing flex/abd lateral wall arcs x 5 R/L  - weighted cable rows, 20# VAHID (40# total) 2 x 10  - weighted cable VAHID extension, 20# VAHID (40# total) x 10, 25# VAHID (50# total) x 10  - weighted cable VAHID extension, 20# VAHID (40# total) x 10, 15# VAHID (30# total) x 10  - weighted cable LT pull down, 60# total bar 2 x 10 Therex  - TRX flexion walkout stretch, 10 sec hold x 8  - biceps stretch on wall 5 x 20 sec   - weighted cable punches with scap retract, 20# x 10 R, 15# x 10 L  - RTB 90/90 ER 2 x 10 R/L (alt sets btwn sides)  - quadruped alt UE/LE with UEs on airex x 10 R/L   - plank airex walkovers x 5  - body blade AP arcs x 7 R/L   Manual   - STM R shoulder  - R shoulder oscillations  - PROM R shoulder flexion, abd, ER, IR  - inferior glides R GHJ, gr II/III Manual   - STM R/L shoulder, L pec major insertion along clavicle   - R/L shoulder oscillations  - PROM R/L shoulder flexion, abd, ER, IR  - inferior glides R/L GHJ, gr II/III  - inferior mobs of L clavicle with AROM L shoulder flexion and add Manual   - STM R shoulder  - PROM R shoulder flexion, abd  - inferior glides R GHJ, gr II/III  - C-PA mobs T2-8, gr II/III Manual   - STM R/L shoulder, L pec major insertion along clavicle   - R/L shoulder oscillations  - PROM R/L shoulder flexion, abd, ER, IR  - inferior glides R/L GHJ, gr II/III  - inferior mobs of L clavicle with AROM L shoulder flexion and add Manual   - STM R/L shoulder, R/L pec major insertion along clavicle   - R/L shoulder oscillations  - PROM R/L shoulder flexion,  abd, ER, IR  - inferior glides R/L GHJ, gr II/III  - median nerve sliders, passive    - - - - -   CP R shoulder x 10 min (no charge) Icing at home Icing at home Icing at home -   HEP: shoulder flexion slides on wall, doorway ER stretch, thoracic extension over foam roll, YTB 90/90 ER, GTB rows     Charges: Manual x 1 (20'), Therex x 2 (25')       Total Timed Treatment: 45 min  Total Treatment Time: 45 min

## 2024-01-15 ENCOUNTER — TELEPHONE (OUTPATIENT)
Dept: PHYSICAL THERAPY | Facility: HOSPITAL | Age: 24
End: 2024-01-15

## 2024-01-16 NOTE — TELEPHONE ENCOUNTER
Called to f/u with patient after canceling last scheduled PT appt for this week and left note that he wanted to reschedule. LVM requesting patient call back with availability to do so.

## 2024-01-17 ENCOUNTER — APPOINTMENT (OUTPATIENT)
Dept: PHYSICAL THERAPY | Facility: HOSPITAL | Age: 24
End: 2024-01-17
Attending: PHYSICIAN ASSISTANT
Payer: COMMERCIAL

## 2024-01-22 ENCOUNTER — OFFICE VISIT (OUTPATIENT)
Dept: PHYSICAL THERAPY | Facility: HOSPITAL | Age: 24
End: 2024-01-22
Attending: PHYSICIAN ASSISTANT
Payer: COMMERCIAL

## 2024-01-22 PROCEDURE — 97140 MANUAL THERAPY 1/> REGIONS: CPT

## 2024-01-22 PROCEDURE — 97110 THERAPEUTIC EXERCISES: CPT

## 2024-01-22 RX ORDER — ESCITALOPRAM OXALATE 10 MG/1
10 TABLET ORAL DAILY
Qty: 90 TABLET | Refills: 0 | Status: SHIPPED | OUTPATIENT
Start: 2024-01-22

## 2024-01-22 NOTE — TELEPHONE ENCOUNTER
There is a MyChart message from today asking for  increase back to 10 mg dose.  I refilled the 10 mg dose.  Please ask pt to send me a MyChart updating me sukhjinder how he he responded to the increase back to 10 mg.  Thx.

## 2024-01-22 NOTE — TELEPHONE ENCOUNTER
Requested Prescriptions     Pending Prescriptions Disp Refills    ESCITALOPRAM 5 MG Oral Tab [Pharmacy Med Name: ESCITALOPRAM 5MG TABLETS] 90 tablet 0     Sig: TAKE 1 TABLET(5 MG) BY MOUTH DAILY       LOV:  10.18.23-cb-physical    LAST CPE: 10.18.23-cb-physical    Last Labs: 11.1.23-lipid,cbc,cmp    Last Refill: 10--90 tabs with 0 refills     Your appointments       Date & Time Appointment Department ([place])    Jan 22, 2024  6:45 PM CST Physical Therapy Ortho Treatment with Tiffany Herman, PT [facility] ([facility])        Feb 07, 2024  6:45 PM CST Physical Therapy Ortho Treatment with Tiffany Herman, PT [facility])        Apr 17, 2024  8:30 AM CDT Follow Up Visit with Twyla Sylvester DO [facility] ([facility])              [facility]  [facility]  [address] [place] [id number]  [phone] [address] [address]  [phone]

## 2024-01-23 NOTE — PROGRESS NOTES
Diagnosis:   Neck pain; Upper back pain         Referring Provider: Jose Juan  Date of Evaluation:    12/6/23    Precautions:  None Next MD visit:   none scheduled  Date of Surgery: n/a   Insurance Primary/Secondary: [employer] [place] P[place] / N/A     # Auth Visits: 8 per POC            Subjective: Patient states that he has generally been doing really well since last seen. There is generally no pain on the L and the overall twinges have been much less frequent. He has not had any of the pain with reaching out in front of him, but can sometimes be stiff. He was doing yoga the other day, laying down stretching arm out to the side and went numb to the fingertips/pins and needles very quickly. When he came out of it, it resolved quickly, but if tried to go back into the position, again, would go numb. He feels like he is 85-90% improved since SOC.     Pain:   R: 1-2/10; more feeling of stiffness  L: 0/10      Objective:   (12/13/23):  PROM shoulder flexion: 139 deg    (12/20/23):  Accessory motion:   Cervical: hypomobile at central and L C4, no radiating pain or reproduction into shoulders or other    Thoracic: hypomobile T1-8 central and VAHID unilateral with local pain reported, T4-5 radiating towards scap VAHID, no recreation of clavicle pain   Clavicle: posterior and inferior glides WNL mild pain with inferior glide on L, kendall if held in inferior glide with shoulder flexion and abd   Palpation: pec major insertion on clavicle very TTP on L and also recreating pain with movement    (12/27/23):  Shoulder AROM: (* denotes performed with pain)  Flexion: R 150 deg*; L 170 deg   Abduction: R 130 deg*; L 175 deg   ER: R 73 deg*; L 90 deg  IR:not reassessed this date      Strength: (* denotes performed with pain)  UE/Scapular   Shoulder Flex: R 4+/5*, L 5/5  Shoulder ABD (C5): R 4+/5*, L 5/5  Shoulder ER: R 4/5*, L 5/5      (1/9/24):  Initiated UE WB tasks, no increasing pain, but mild quick to fatigue      Assessment: Mild restriction in  median nerve mobility likely the cause of pins and needles feeling in UE, given median nerve slider for home, following this in session, was able to perform supine pec stretch without pins and needles or numbness. Continued working on scap/RC stability within tolerance [facility] and [facility].       Goals: (To be met in 8 visits)   Patient will improve R shoulder flexion AROM to at least 160 deg without pain to improve ability to reach OH without restriction. - progress  Patient will improve R shoulder ER AROM to at least 85 deg without pain to improve ability to reach behind head and dress without restriction. - progress  Patient will improve R shoulder strength to 5/5 throughout to improve ability to lift gallon of milk from fridge without restriction. - progress  Patient will report improved ability to sleep on both sides at night. - progress  Patient will be IND and compliant in HEP to maintain progress made in PT. - issued and progressed     Plan: Continue PT with progression as indicated. Likely D/C in next 1-2 visits.   Date: 12/13/2023  TX#: 2/8 Date: 12/20/23               TX#: 3/8 Date: 12/27/23                TX#: 4/8 Date: 1/3/24               TX#: 5/8 Date: 1/9/23  Tx#: 6/8 Date: 1/22/23  Tx#: 7/8   Therex  - UBE L2 retro x 4 min  - prone scap retract with VAHID ext, 0# x 10  - prone reverse fly VAHID, 0# x 10  - prone row, 4# VAHID 2 x 10  - SL shoulder abd to 85 deg, 2# 2 x 10  - SL ER with towel roll, 2# 2 x 20  - flexion pulleys x 2 min  - standing VAHID YTB ER into OH flexion 2 x 5 Therex  - UBE L2 retro x 4 min  - continued assessment as needed and listed   - prone scap retract with VAHID ext, 0# x 10  - prone reverse fly VAHID, 0# x 10  - thoracic extension over 1/2 foam roll, multiple bouts at 2 levels    Therex  - SB ROM on wall x 10  - thoracic extension over 1/2 foam roll, multiple bouts at 2 levels   - doorway ER stretch, 3 x 20 sec R/L  - TRX flexion walkout stretch, 10 sec hold x 5  - standing against wall with  ball at mid-thoracic, AROM VAHID flexion x 10; with hands behind head butterflies x 10  - YTB ER with towel roll 2 x 10 R/L   - GTB rows x 15 Therex  - thoracic extension over 1/2 foam roll, multiple bouts at 2 levels   - TRX flexion walkout stretch, 10 sec hold x 8  - standing flex/abd lateral wall arcs x 5 R/L  - weighted cable rows, 20# VAHID (40# total) 2 x 10  - weighted cable VAHID extension, 20# VAHID (40# total) x 10, 25# VAHID (50# total) x 10  - weighted cable VAHID extension, 20# VAHID (40# total) x 10, 15# VAHID (30# total) x 10  - weighted cable LT pull down, 60# total bar 2 x 10 Therex  - TRX flexion walkout stretch, 10 sec hold x 8  - biceps stretch on wall 5 x 20 sec   - weighted cable punches with scap retract, 20# x 10 R, 15# x 10 L  - RTB 90/90 ER 2 x 10 R/L (alt sets btwn sides)  - quadruped alt UE/LE with UEs on airex x 10 R/L   - plank airex walkovers x 5  - body blade AP arcs x 7 R/L Therex  - TRX flexion walkout stretch, 5 sec hold x 10  - supine on foam roll, angels x 10  - supine on foam roll 90 deg flexion x 30 sec; 110 deg x 30 sec, 130 deg x 30 sec  - median nerve sliders x 20, slow to end range, no cervical   - RTB 90/90 ER 2 x 10 R/L  - quadruped alt UE/LE with UEs on airex x 10 R/L   - SB wall push-ups 2 x 10  - supine dynamic rhythmic stab with 7# weight 3 x 30 sec   Manual   - STM R shoulder  - R shoulder oscillations  - PROM R shoulder flexion, abd, ER, IR  - inferior glides R GHJ, gr II/III Manual   - STM R/L shoulder, L pec major insertion along clavicle   - R/L shoulder oscillations  - PROM R/L shoulder flexion, abd, ER, IR  - inferior glides R/L GHJ, gr II/III  - inferior mobs of L clavicle with AROM L shoulder flexion and add Manual   - STM R shoulder  - PROM R shoulder flexion, abd  - inferior glides R GHJ, gr II/III  - C-PA mobs T2-8, gr II/III Manual   - STM R/L shoulder, L pec major insertion along clavicle   - R/L shoulder oscillations  - PROM R/L shoulder flexion, abd, ER, IR  -  inferior glides R/L GHJ, gr II/III  - inferior mobs of L clavicle with AROM L shoulder flexion and add Manual   - STM R/L shoulder, R/L pec major insertion along clavicle   - R/L shoulder oscillations  - PROM R/L shoulder flexion, abd, ER, IR  - inferior glides R/L GHJ, gr II/III  - median nerve sliders, passive  Manual   - STM R pec, supraspinatus  - passive median nerve sliders   - - - - - -   CP R shoulder x 10 min (no charge) Icing at home Icing at home Icing at home - -   HEP: shoulder flexion slides on wall, doorway ER stretch, thoracic extension over foam roll, YTB 90/90 ER, GTB rows, median nerve sliders    Charges: Manual x 1 (15'), Therex x 2 (30')       Total Timed Treatment: 45 min  Total Treatment Time: 45 min

## 2024-01-27 ENCOUNTER — PATIENT MESSAGE (OUTPATIENT)
Dept: INTERNAL MEDICINE CLINIC | Facility: CLINIC | Age: 24
End: 2024-01-27

## 2024-01-27 DIAGNOSIS — L21.0 DANDRUFF IN ADULT: Primary | ICD-10-CM

## 2024-01-29 ENCOUNTER — APPOINTMENT (OUTPATIENT)
Dept: PHYSICAL THERAPY | Facility: HOSPITAL | Age: 24
End: 2024-01-29
Attending: PHYSICIAN ASSISTANT
Payer: COMMERCIAL

## 2024-01-30 NOTE — TELEPHONE ENCOUNTER
From: Valeriy Mercado  To: Karli Manzano  Sent: 1/27/2024 10:53 AM CST  Subject: Medication Request    Salma Hill and team,    In the past I’ve used ketoconazole 2 % shampoo to treat dandruff. I was hoping you could reorder this shampoo since my refills are no longer valid.     Thank you!  -Alli   
LOV 10/18/23     CB - Would you order? Or needs OV?   
What Type Of Note Output Would You Prefer (Optional)?: Standard Output
Hpi Title: Evaluation of Skin Lesions
How Severe Are Your Spot(S)?: mild
Have Your Spot(S) Been Treated In The Past?: has not been treated

## 2024-02-07 ENCOUNTER — APPOINTMENT (OUTPATIENT)
Dept: PHYSICAL THERAPY | Facility: HOSPITAL | Age: 24
End: 2024-02-07
Attending: PHYSICIAN ASSISTANT
Payer: COMMERCIAL

## 2024-02-07 ENCOUNTER — LAB ENCOUNTER (OUTPATIENT)
Dept: LAB | Age: 24
End: 2024-02-07
Attending: EMERGENCY MEDICINE
Payer: COMMERCIAL

## 2024-02-07 DIAGNOSIS — E06.3 HASHIMOTO'S THYROIDITIS: ICD-10-CM

## 2024-02-07 LAB
T4 FREE SERPL-MCNC: 1.2 NG/DL (ref 0.8–1.7)
TSI SER-ACNC: 0.6 MIU/ML (ref 0.36–3.74)

## 2024-02-07 PROCEDURE — 84443 ASSAY THYROID STIM HORMONE: CPT

## 2024-02-07 PROCEDURE — 36415 COLL VENOUS BLD VENIPUNCTURE: CPT

## 2024-02-07 PROCEDURE — 84439 ASSAY OF FREE THYROXINE: CPT

## 2024-02-19 ENCOUNTER — ORDER TRANSCRIPTION (OUTPATIENT)
Dept: SLEEP CENTER | Age: 24
End: 2024-02-19

## 2024-02-19 DIAGNOSIS — G47.33 OSA (OBSTRUCTIVE SLEEP APNEA): Primary | ICD-10-CM

## 2024-03-25 ENCOUNTER — OFFICE VISIT (OUTPATIENT)
Facility: CLINIC | Age: 24
End: 2024-03-25
Payer: COMMERCIAL

## 2024-03-25 VITALS
RESPIRATION RATE: 16 BRPM | DIASTOLIC BLOOD PRESSURE: 66 MMHG | OXYGEN SATURATION: 99 % | HEIGHT: 65 IN | BODY MASS INDEX: 26.33 KG/M2 | SYSTOLIC BLOOD PRESSURE: 128 MMHG | HEART RATE: 68 BPM | WEIGHT: 158 LBS

## 2024-03-25 DIAGNOSIS — E06.3 HASHIMOTO'S DISEASE: ICD-10-CM

## 2024-03-25 DIAGNOSIS — Z87.890 TRANSGENDER WITH HISTORY OF GENDER AFFIRMATION SURGERY: Primary | ICD-10-CM

## 2024-03-25 PROCEDURE — 99214 OFFICE O/P EST MOD 30 MIN: CPT | Performed by: STUDENT IN AN ORGANIZED HEALTH CARE EDUCATION/TRAINING PROGRAM

## 2024-03-25 RX ORDER — TRIAMCINOLONE ACETONIDE 1 MG/G
CREAM TOPICAL
COMMUNITY
Start: 2023-11-04

## 2024-03-25 NOTE — PATIENT INSTRUCTIONS
Please do your next set of labs in around six months before our next appointment (fasting, 8AM, mid-week between injections).   Let me know if you need refills before then.   I will look into any concerns with testosterone + finasteride and let you know.     Return Visit   [X] Physician in 6 months

## 2024-03-26 PROBLEM — Z79.890 ENCOUNTER FOR MONITORING TESTOSTERONE REPLACEMENT THERAPY: Status: ACTIVE | Noted: 2024-03-26

## 2024-03-26 PROBLEM — Z51.81 ENCOUNTER FOR MONITORING TESTOSTERONE REPLACEMENT THERAPY: Status: ACTIVE | Noted: 2024-03-26

## 2024-03-26 PROBLEM — E06.3 HASHIMOTO'S DISEASE: Status: ACTIVE | Noted: 2024-03-26

## 2024-03-26 NOTE — PROGRESS NOTES
Endocrinology Clinic Note    Name: Valeriy Mercado - preferred name \"Alli\", pronouns he/him    Date: 3/25/24      HISTORY OF PRESENT ILLNESS   Valeriy Mercado is a 24 year old gender male (female to male) status post bilateral mastectomy for gender affirming surgery and currently on testosterone hormonal therapy, generalized anxiety disorder presenting to [facility] care for gender affirmation. Patient reports that he started testosterone therapy in March 2017, was previously treated at [facility] where he used to live. Reports that he underwent top surgery in June 2018 with no complications. He has been on weekly testosterone cypionate intramuscular injections since the start of his therapy, initially was on 0.35/week and switch to 0.3/week approximately 1 year ago due to worsening hair loss and thinning. Per review, the patient has had elevated testosterone levels in the past as well. Reports that he is interested in bottom surgery at some point but would like to discuss this in the future. 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 No shortness of breath, signs or symptoms of DVT. No skin reactions to testosterone injections. Reports that he generally takes his testosterone on schedule every Sunday, but occasionally misses injections and takes them the next day. Reports he is overall satisfied with his appearance and presentation. No other new complaints.    Interval History:  -Started on LT4 after findings of hypothyroidism + positive antithyroid antibodies, does not feel different with taking medication but no adverse effects  -Continues to struggle with hair loss, seeing dermatology who recommend starting finasteride therapy    PAST MEDICAL HISTORY:   No past medical history on file.    PAST SURGICAL HISTORY:   Past Surgical History:   Procedure Laterality Date    MASTECTOMY      bilteral    TONSILLECTOMY         CURRENT MEDICATIONS:    Current Outpatient Medications   Medication Sig Dispense  Refill    triamcinolone 0.1 % External Cream       Ketoconazole 1 % External Shampoo Apply 1 Application topically twice a week. 200 mL 3    escitalopram 10 MG Oral Tab Take 1 tablet (10 mg total) by mouth daily. 90 tablet 0    levothyroxine 75 MCG Oral Tab Take 1 tablet (75 mcg total) by mouth before breakfast. 90 tablet 1    testosterone cypionate 200 mg/mL Intramuscular Solution INJECT 0.30 ML IN THE MUSCLE OR UNDER THE SKIN ONCE A WEEK. 10 mL 3    Needle, Disp, 18G X 1-1/2\" Does not apply Misc       MULTIPLE VITAMIN IV Take 1 tablet by mouth daily.       Endocrine Medications            levothyroxine 75 MCG Oral Tab            ALLERGIES:  No Known Allergies    SOCIAL HISTORY:    Social History     Socioeconomic History    Marital status: Single   Tobacco Use    Smoking status: Never    Smokeless tobacco: Never   Substance and Sexual Activity    Drug use: Not Currently    Sexual activity: Yes       FAMILY HISTORY:   Family History   Problem Relation Age of Onset    Hypertension Mother     Anxiety Mother     Hypertension Father     Hyperlipidemia Father     Depression Father     Other (missing heart valve) Sister     Anxiety Sister     Depression Sister     Migraines Sister     Pacemaker Maternal Grandfather     Hypertension Maternal Grandfather     Melanoma Maternal Grandfather     Pancreatic Cancer Paternal Grandmother          REVIEW OF SYSTEMS:  Ten point review of systems has been performed and is otherwise negative and/or non-contributory, except as described above.      PHYSICAL EXAM:   Vitals:    03/25/24 1531   BP: 128/66   BP Location: Right arm   Patient Position: Sitting   Cuff Size: adult   Pulse: 68   Resp: 16   SpO2: 99%   Weight: 158 lb (71.7 kg)   Height: 5' 5\" (1.651 m)     BMI: Body mass index is 26.29 kg/m².     CONSTITUTIONAL:  awake, alert, cooperative, no apparent distress, and appears stated age  PSYCH: normal affect  EYES:  No proptosis,  conjunctiva normal  ENT:  Normocephalic,  atraumatic  NECK:  Supple, symmetrical, thyroid not enlarged and no tenderness  LUNGS: breathing comfortably  CARDIOVASCULAR:  regular rate   ABDOMEN:  soft  SKIN:  no rashes and no lesions. Beard/facial hair present, receded hairline with patchy hair loss on posterior scalp.   EXTREMITIES: no edema      DATA:     Ref Range & Units 12/6/22  4:25 PM   Testosterone,  Total  240 - 871 ng/dL 882 High      Ref Range & Units 12/6/22  4:25 PM   Hemoglobin  11.6 - 15.6 g/dL      Ref Range & Units 12/6/22  4:25 PM   Hematocrit  34 - 46 % 45     Ref Range & Units 12/6/22  4:25 PM   TSH  0.35 - 4.94 uIU/mL 1.34      Latest Reference Range & Units 11/01/23 07:50 02/07/24 14:59   T4,Free (Direct) 0.8 - 1.7 ng/dL 0.7 (L) 1.2   TSH 0.358 - 3.740 mIU/mL 5.450 (H) 0.601   (L): Data is abnormally low  (H): Data is abnormally high     Latest Reference Range & Units 11/01/23 07:50   ANTI-THYROPEROXIDASE <60 U/mL 732 (H)   (H): Data is abnormally high      ASSESSMENT AND PLAN:    #Transgender with history of gender affirmation surgery  - 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 This may require dose adjustment of testosterone while on finasteride. Otherwise, no major contraindications to concurrent therapy are noted.   - Repeat mid-week testosterone level, 8AM and fasting  - Obtain CBC, lipid panel, CMP (with fasting BG)  - When patient would like to pursue bottom surgery, recommend referral to surgeons experienced with  gender-affirming surgery (academic center if needed)  - No evidence of HTN, will monitor  - Uterine tissue/cervix present, patient had pap smear performed end of 2023  - Mammograms not required as pt is s/p bilateral mastectomy    #Hashimoto's disease  -Started on LT4 at the end of 2023 for hypothyroid labs and positive TPO Ab, achieved biochemical euthyroidism on 75mcg daily  -Repeat TFTs with next labs    The above plan was discussed in detail with the patient who verbalized understanding and agreement.      A total of 30 minutes was spent today on obtaining history, reviewing pertinent labs, reviewing relevant pathophysiology with patient, evaluating patient, providing multiple treatment options, and completing documentation and orders.      Twyla Sylvester DO  [facility]  3/25/2024     Note to patient: The 21 Century Cures Act makes medical notes like these available to patients in the interest of transparency. However, be advised this is a medical document. It is intended as peer to peer communication. It is written in medical language and may contain abbreviations or verbiage that are unfamiliar. It may appear blunt or direct. Medical documents are intended to carry relevant information, facts as evident, and the clinical opinion of the practitioner.

## 2024-05-06 DIAGNOSIS — E06.3 HASHIMOTO'S THYROIDITIS: ICD-10-CM

## 2024-05-06 RX ORDER — LEVOTHYROXINE SODIUM 0.07 MG/1
75 TABLET ORAL
Qty: 90 TABLET | Refills: 1 | Status: SHIPPED | OUTPATIENT
Start: 2024-05-06

## 2024-05-06 NOTE — TELEPHONE ENCOUNTER
LOV: 03/25/2024     Next office visit: 09/25/2024     Last filled: 11/03/2023       Order pended and routed to provider

## 2024-05-07 NOTE — PROGRESS NOTES
Chief Complaint   Patient presents with    Musculoskeletal Problem     TA RM Twisted right knee on Saturday.        HPI:  Pt presents with c/o R knee pain X 4 days ago.  Pt has been using NSAIDs, icing and wearing a brace since accident.  Pt fell while playing laser tag.  Feels like he planted and twisted to the L.  Had pain immediately and has had swelling.  + decreased ROM.  Feels like pain is difficult to localize but possibly medial.    Review of Systems   See HPI.  No other complaints today.    No past medical history on file.    Patient Active Problem List   Diagnosis    EUGENIA (generalized anxiety disorder)    Family history of congenital heart disease in sister    Transgender with history of gender affirmation surgery    Hashimoto's disease    Encounter for monitoring testosterone replacement therapy       Current Outpatient Medications   Medication Sig Dispense Refill    LEVOTHYROXINE 75 MCG Oral Tab TAKE 1 TABLET(75 MCG) BY MOUTH BEFORE BREAKFAST 90 tablet 1    triamcinolone 0.1 % External Cream       Ketoconazole 1 % External Shampoo Apply 1 Application topically twice a week. 200 mL 3    escitalopram 10 MG Oral Tab Take 1 tablet (10 mg total) by mouth daily. 90 tablet 0    testosterone cypionate 200 mg/mL Intramuscular Solution INJECT 0.30 ML IN THE MUSCLE OR UNDER THE SKIN ONCE A WEEK. 10 mL 3    Needle, Disp, 18G X 1-1/2\" Does not apply Misc       MULTIPLE VITAMIN IV Take 1 tablet by mouth daily.         Physical Exam  /76   Pulse 78   Temp 97.4 °F (36.3 °C)   Resp 16   Ht 5' 5\" (1.651 m)   Wt 160 lb 6.4 oz (72.8 kg)   SpO2 96%   BMI 26.69 kg/m²   Constitutional:  No distress.   HEENT:  Normocephalic and atraumatic.  MS:  R knee:  decreased PROM in flexion, + medial jt line tenderness.  Negative varus and valgus stress.  Patellar tracking is not painful.  Neg ant and post drawer.      A/P:    Encounter Diagnosis   Name Primary?    Acute pain of right knee - Meniscal tear vs MCL strain vs ACL  tear vs other.  Continue RICE.  X-ray today.  Refer to Ortho for further evaluation.  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   Yes       No orders of the defined types were placed in this encounter.      Meds & Refills for this Visit:  Requested Prescriptions      No prescriptions requested or ordered in this encounter       Imaging & Consults:  ORTHOPEDIC - INTERNAL  XR KNEE, COMPLETE (4 OR MORE VIEWS), RIGHT (CPT=73564)    Return if symptoms worsen or fail to improve.  There are no Patient Instructions on file for this visit.    All questions were answered and the patient understands the plan.

## 2024-05-08 ENCOUNTER — HOSPITAL ENCOUNTER (OUTPATIENT)
Dept: GENERAL RADIOLOGY | Age: 24
Discharge: HOME | End: 2024-05-08
Attending: PHYSICIAN ASSISTANT
Payer: COMMERCIAL

## 2024-05-08 ENCOUNTER — OFFICE VISIT (OUTPATIENT)
Dept: INTERNAL MEDICINE CLINIC | Facility: CLINIC | Age: 24
End: 2024-05-08
Payer: COMMERCIAL

## 2024-05-08 VITALS
DIASTOLIC BLOOD PRESSURE: 76 MMHG | OXYGEN SATURATION: 96 % | HEART RATE: 78 BPM | SYSTOLIC BLOOD PRESSURE: 124 MMHG | WEIGHT: 160.38 LBS | HEIGHT: 65 IN | BODY MASS INDEX: 26.72 KG/M2 | RESPIRATION RATE: 16 BRPM | TEMPERATURE: 97 F

## 2024-05-08 DIAGNOSIS — M25.561 ACUTE PAIN OF RIGHT KNEE: Primary | ICD-10-CM

## 2024-05-08 DIAGNOSIS — M25.561 ACUTE PAIN OF RIGHT KNEE: ICD-10-CM

## 2024-05-08 DIAGNOSIS — Z01.89 ENCOUNTER FOR LOWER EXTREMITY COMPARISON IMAGING STUDY: ICD-10-CM

## 2024-05-08 PROCEDURE — 99213 OFFICE O/P EST LOW 20 MIN: CPT | Performed by: PHYSICIAN ASSISTANT

## 2024-05-08 PROCEDURE — 73562 X-RAY EXAM OF KNEE 3: CPT | Performed by: PHYSICIAN ASSISTANT

## 2024-05-08 PROCEDURE — 73564 X-RAY EXAM KNEE 4 OR MORE: CPT | Performed by: PHYSICIAN ASSISTANT

## 2024-05-13 ENCOUNTER — OFFICE VISIT (OUTPATIENT)
Dept: ORTHOPEDICS CLINIC | Facility: CLINIC | Age: 24
End: 2024-05-13
Payer: COMMERCIAL

## 2024-05-13 VITALS — HEIGHT: 65 IN | WEIGHT: 160 LBS | BODY MASS INDEX: 26.66 KG/M2

## 2024-05-13 DIAGNOSIS — M25.361 PATELLAR INSTABILITY OF RIGHT KNEE: Primary | ICD-10-CM

## 2024-05-13 PROCEDURE — 99204 OFFICE O/P NEW MOD 45 MIN: CPT | Performed by: ORTHOPAEDIC SURGERY

## 2024-05-13 RX ORDER — ESCITALOPRAM OXALATE 10 MG/1
10 TABLET ORAL DAILY
Qty: 90 TABLET | Refills: 3 | Status: SHIPPED | OUTPATIENT
Start: 2024-05-13

## 2024-05-13 NOTE — TELEPHONE ENCOUNTER
Refill Per Protocol     Requested Prescriptions   Pending Prescriptions Disp Refills    ESCITALOPRAM 10 MG Oral Tab [Pharmacy Med Name: ESCITALOPRAM 10MG TABLETS] 90 tablet 0     Sig: TAKE 1 TABLET(10 MG) BY MOUTH DAILY       Psychiatric Non-Scheduled (Anti-Anxiety) Passed - 5/10/2024  7:10 AM        Passed - In person appointment or virtual visit in the past 6 mos or appointment in next 3 mos     Recent Outpatient Visits              Today Patellar instability of right knee    [address] Radha Grajeda MD    Office Visit    5 days ago Acute pain of right knee    [address]    Office Visit    1 month ago Transgender with history of gender affirmation surgery    [facility] Twyla Sylvester DO    Office Visit    3 months ago     [facility] Tiffany Herman, PT    Office Visit    4 months ago     [facility] Tiffany Herman, PT    Office Visit          Future Appointments         Provider Department Appt Notes    In 4 months Twyla Sylvester DO [facility] FU 6 months / Testosterone / Thyroid follow up  LOV 3/25/24                    Passed - Depression Screening completed within the past 12 months               Future Appointments         Provider Department Appt Notes    In 4 months Twyla Sylvester DO [facility] FU 6 months / Testosterone / Thyroid follow up  LOV 3/25/24          Recent Outpatient Visits              Today Patellar instability of right knee    [address] Radha Grajeda MD    Office Visit    5 days ago Acute pain of right knee    [address] Karli Ruiz PA-C    Office Visit    1 month ago Transgender with history of gender  affirmation surgery    [facility] Twyla Sylvester,     Office Visit    3 months ago     [facility] Tiffany Herman, PT    Office Visit    4 months ago     [facility] Tiffany Herman, PT    Office Visit

## 2024-05-13 NOTE — H&P
Orthopaedic Surgery  [address] [place] [id number]  [phone]     NEW PATIENT VISIT - HISTORY AND PHYSICAL EXAMINATION     Name: Valeriy Mercado   MRN: [id number]  Date: 5/13/2024     CC: Right Knee Pain    REFERRED BY: Ilya Padilla MD    HPI:   Valeriy Mercado is a very pleasant 24 year old adult who presents today for evaluation of right knee injury sustained 5/4/24. He was playing laser tag and twisted the knee and fell. He reports his kneecap slid laterally during the injury. Prior to this, he subluxated knee twice in the past. Pain is 4/10 with swelling, stiffness and instability. This improves with rest and worsens with bending or extending. He has tried anti-inflammatories, ace bandage and icing.     He enjoys hiking, biking, and gardening. Lives with fiance and dog. 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     PMH:   History reviewed. No pertinent past medical history.    PAST SURGICAL HX:  Past Surgical History:   Procedure Laterality Date    Mastectomy      bilteral    Tonsillectomy         FAMILY HX:  Family History   Problem Relation Age of Onset    Hypertension Mother     Anxiety Mother     Hypertension Father     Hyperlipidemia Father     Depression Father     Other (missing heart valve) Sister     Anxiety Sister     Depression Sister     Migraines Sister     Pacemaker Maternal Grandfather     Hypertension Maternal Grandfather     Melanoma Maternal Grandfather     Pancreatic Cancer Paternal Grandmother        ALLERGIES:  Patient has no known allergies.    MEDICATIONS:   Current Outpatient Medications   Medication Sig Dispense Refill    LEVOTHYROXINE 75 MCG Oral Tab TAKE 1 TABLET(75 MCG) BY MOUTH BEFORE BREAKFAST 90 tablet 1    triamcinolone 0.1 % External Cream       Ketoconazole 1 % External Shampoo Apply 1 Application topically twice a week. 200 mL 3    escitalopram 10 MG Oral Tab Take 1 tablet (10 mg total) by mouth daily. 90 tablet 0    testosterone cypionate 200  mg/mL Intramuscular Solution INJECT 0.30 ML IN THE MUSCLE OR UNDER THE SKIN ONCE A WEEK. 10 mL 3    Needle, Disp, 18G X 1-1/2\" Does not apply Misc       MULTIPLE VITAMIN IV Take 1 tablet by mouth daily.         ROS: A comprehensive 14 point review of systems was performed and was negative aside from the aforementioned per history of present illness.    SOCIAL HX:  Social History     Tobacco Use    Smoking status: Never    Smokeless tobacco: Never   Substance Use Topics    Alcohol use: Not on file       PE:   Vitals:    05/13/24 0833   Weight: 160 lb   Height: 5' 5\" (1.651 m)     Estimated body mass index is 26.63 kg/m² as calculated from the following:    Height as of this encounter: 5' 5\" (1.651 m).    Weight as of this encounter: 160 lb.    Physical Exam  Constitutional:       Appearance: Normal appearance.   HENT:      Head: Normocephalic and atraumatic.   Eyes:      Extraocular Movements: Extraocular movements intact.   Neck:      Musculoskeletal: Normal range of motion and neck supple.   Cardiovascular:      Pulses: Normal pulses.   Pulmonary:      Effort: Pulmonary effort is normal. No respiratory distress.   Abdominal:      General: There is no distension.   Skin:     General: Skin is warm.      Capillary Refill: Capillary refill takes less than 2 seconds.      Findings: No bruising.   Neurological:      General: No focal deficit present.      Mental Status: Alert.   Psychiatric:         Mood and Affect: Mood normal.     Examination of the right knee demonstrates:   Skin is intact, warm and dry.   Atrophy: none    Effusion: small    Joint line tenderness: medial  Crepitation: none   Naomi: Negative   Patellar mobility: hypermobile without apprehension  J-sign: none    ROM: Extension full  Flexion 140 degrees  ACL:  Negative Lachman, Negative Pivot Shift   PCL:  Negative Posterior Drawer  Collateral Ligaments: Stable to Varus and Valgus stress at 0 and 30 degrees  Strength: mild weakness   Hip joint: normal  pain-free ROM   Gait:  mildly antalgic   Leg length: equal and symmetric  Alignment:  neutral     No obvious peripheral edema noted.   Distal neurovascular exam demonstrates normal perfusion, intact sensation to light touch and full strength.     Examination of the contralateral knee demonstrates:  No significant atrophy, swelling or effusion. Full range of motion. Neurovascularly intact distally    Radiographic Examination/Diagnostics:  Knee XR personally viewed, independently interpreted and radiology report was reviewed.    XR KNEE (3 VIEWS), LEFT (CPT=73562)    Result Date: 5/8/2024  PROCEDURE:  XR KNEE ROUTINE (3 VIEWS), LEFT (CPT=73562)  TECHNIQUE:  Three views were obtained including patellar view.  COMPARISON:  None.  INDICATIONS:  Right-sided knee pain.  Contralateral knee imaging for comparison.  PATIENT STATED HISTORY: (As transcribed by Technologist)  Patient stated right knee pain after injury while playing laser tag on 5/4/24. Left knee for comparison                CONCLUSION:   Negative for fracture or malalignment.  Normal mineralization.  Joint spaces are preserved.  No patellar subluxation.  No joint effusion or focal soft tissue swelling.     LOCATION:  [facility]   Dictated by (CST): Conor Corona MD on 5/08/2024 at 8:53 AM     Finalized by (CST): Conor Corona MD on 5/08/2024 at 8:54 AM       XR KNEE, COMPLETE (4 OR MORE VIEWS), RIGHT (CPT=73564)    Result Date: 5/8/2024  PROCEDURE:  XR KNEE, COMPLETE (4 OR MORE VIEWS), RIGHT (CPT=73564)  TECHNIQUE:  AP, lateral, sunrise, and tunnel views were obtained  COMPARISON:  None.  INDICATIONS:  M25.561 Acute pain of right knee  PATIENT STATED HISTORY: (As transcribed by Technologist)  Patient stated right knee pain after injury while playing laser tag on 5/4/24.               CONCLUSION:   Negative for fracture or malalignment.  Normal mineralization.  Joint spaces are preserved.  No patellar subluxation.  No joint effusion or focal soft tissue swelling.      LOCATION:  [facility]   Dictated by (CST): Conor Corona MD on 5/08/2024 at 8:53 AM     Finalized by (CST): Conor Corona MD on 5/08/2024 at 8:53 AM         No evidence of fracture or dislocation.  Well-appearing radiographs on independent review.    IMPRESSION: Valeriy Mercado is a 24 year old adult with right knee patellar instability sustained after twisting the knee and falling on 5/4/24.  Notably, this is recurrent with history of 2 prior dislocations most recently in 2016.     In light of physical findings, we elected to order an MRI to further characterize internal derangement.     PLAN:   We had a detailed discussion outlining the etiology, anatomy, pathophysiology, and natural history of patient's findings. Imaging was reviewed in detail and correlated to a 3-dimensional model of the knee.     In light of the acute traumatic incident, loss of normal function, and  failure to progress conservatively we recommend an MRI to evaluate the integrity of the patient's findings. The patient will follow up after imaging.   Differential diagnosis includes but not limited to: cartilage injury/loose body, meniscus tear/injury, ACL tear, bone marrow edema, and osteoarthritis.     External records were also reviewed for pertinent historical findings contributing to the patients undiagnosed new problem with uncertain prognosis.     We also provided a patellar stabilization brace for support.    The patient had the opportunity to ask questions, and all questions were answered appropriately.      FOLLOW-UP:  Return to clinic after MRI completion.       Radha Grajeda MD  Knee, Shoulder, & Elbow Surgery / Sports Medicine Specialist  Orthopaedic Surgery  [address] [place] [address] [id number] [place] [address].org  [email or web address]  t: [phone]  o: [phone]  f: [phone]    This note was dictated using Dragon software.  While it was briefly proofread prior to completion, some grammatical, spelling, and word  choice errors due to dictation may still occur.

## 2024-05-23 DIAGNOSIS — E06.3 HASHIMOTO'S THYROIDITIS: ICD-10-CM

## 2024-05-23 RX ORDER — LEVOTHYROXINE SODIUM 0.07 MG/1
75 TABLET ORAL
Qty: 90 TABLET | Refills: 1 | OUTPATIENT
Start: 2024-05-23

## 2024-05-23 RX ORDER — LEVOTHYROXINE SODIUM 0.07 MG/1
75 TABLET ORAL
Qty: 90 TABLET | Refills: 0 | Status: SHIPPED | OUTPATIENT
Start: 2024-05-23

## 2024-05-23 NOTE — TELEPHONE ENCOUNTER
LOV: 3/25/24     RTC: 6 months     FU: scheduled 9/25/24     Last Refill: 5/6/24 - 90 days, 1 refill     Month Supply Pending: too soon- refill denied

## 2024-05-23 NOTE — TELEPHONE ENCOUNTER
RN phoned pharmacy to confirm they still have a prescription on file for patient-     Pharmacy states that insurance will not pay for any prescription that has a refill attached- requesting that we send in a prescription with no refills.    There is no explanation for this.    Pended prescription with 0 refills and routed for review.

## 2024-06-03 ENCOUNTER — TELEPHONE (OUTPATIENT)
Dept: PHYSICAL THERAPY | Facility: HOSPITAL | Age: 24
End: 2024-06-03

## 2024-06-10 ENCOUNTER — TELEPHONE (OUTPATIENT)
Dept: PHYSICAL THERAPY | Facility: HOSPITAL | Age: 24
End: 2024-06-10

## 2024-06-11 ENCOUNTER — OFFICE VISIT (OUTPATIENT)
Dept: PHYSICAL THERAPY | Age: 24
End: 2024-06-11
Attending: ORTHOPAEDIC SURGERY
Payer: COMMERCIAL

## 2024-06-11 DIAGNOSIS — M25.361 PATELLAR INSTABILITY OF RIGHT KNEE: Primary | ICD-10-CM

## 2024-06-11 PROCEDURE — 97110 THERAPEUTIC EXERCISES: CPT

## 2024-06-11 PROCEDURE — 97161 PT EVAL LOW COMPLEX 20 MIN: CPT

## 2024-06-11 PROCEDURE — 97140 MANUAL THERAPY 1/> REGIONS: CPT

## 2024-06-11 NOTE — PROGRESS NOTES
LOWER EXTREMITY EVALUATION:     Diagnosis:   R knee patellar instability Referring Provider: Radha Grajeda  Date of Evaluation:    6/11/2024    Precautions:  None Next MD visit:   none scheduled  Date of Surgery: n/a     PATIENT SUMMARY   Valeriy Mercado is a 24 year old adult who presents to therapy today with complaints of R knee pain which began after the 2nd week of May.  He was playing laser tag and fell on his left side.  Following this, pt had pain, swelling, and had difficulty with WB and amb.  Since the injury, his symptoms are improving, but he still notices anterior knee pain with activities.  He notes having a history of R patella subluxations.  Pt describes pain level current 3/10, at best 0/10, at worst 6/10.  Pt denies LE numbness/tingling.   Pain is located in the anterior knee.  Current functional limitations include descending stairs, prolonged standing, and return to PLOF with participation in golf and yoga.    Valeriy describes prior level of function no limitation. Pt goals include increase strength  Past medical history was reviewed with Valeriy. Significant findings include:  No past medical history on file.    Recent Imaging:  XR KNEE (3 VIEWS), LEFT (CPT=73562)    Result Date: 5/8/2024  CONCLUSION:   Negative for fracture or malalignment.  Normal mineralization.  Joint spaces are preserved.  No patellar subluxation.  No joint effusion or focal soft tissue swelling.     LOCATION:  [facility]   Dictated by (CST): Conor Corona MD on 5/08/2024 at 8:53 AM     Finalized by (CST): Conor Corona MD on 5/08/2024 at 8:54 AM       XR KNEE, COMPLETE (4 OR MORE VIEWS), RIGHT (CPT=73564)    Result Date: 5/8/2024  CONCLUSION:   Negative for fracture or malalignment.  Normal mineralization.  Joint spaces are preserved.  No patellar subluxation.  No joint effusion or focal soft tissue swelling.     LOCATION:  [facility]   Dictated by (CST): Conor Corona MD on 5/08/2024 at 8:53 AM     Finalized by (CST): Conor Corona  MD on 5/08/2024 at 8:53 AM        ASSESSMENT  Valeriy presents to physical therapy evaluation with primary c/o R knee pain after a fall in early May. The results of the objective tests and measures show decreased quad and knee stabilization strength and lateral patella tracking in knee flexion.  Functional deficits include but are not limited to descending stairs, prolonged standing, and return to PLOF with participation in golf and yoga.  Signs and symptoms are consistent with diagnosis of R patellar instability. Pt and PT discussed evaluation findings, pathology, POC and HEP.  Pt voiced understanding and performs HEP correctly without reported pain. Skilled Physical Therapy is medically necessary to address the above impairments and reach functional goals.     OBJECTIVE:   Observation: lateral patella tracking at end range flexion on R  Palpation: no TTP  Sensation: intact to gross touch         A/PROM Strength    R L R L   Hip WNL WNL       Flexion   5 5     Extension   4 4     Abduction   4 4   Knee WNL WNL       Flexion   4+ 5     Extension   4* 5   Foot / Ankle WNL WNL     * indicates pain  Accessory motion: hypermobility of patella    Flexibility:  Hip Flexor: WNL  Hamstrings: WNL    Today’s Treatment and Response:   Pt education was provided on exam findings, treatment diagnosis, treatment plan, expectations, and prognosis.   Date: 6/11/2024  Tx#:   1   STM - distal ITB, lat quad  Lat patellar lift knee ext, medial glide and lat lift with knee flexion, Gr III   SLR, x20  Clam, R/L, x20, red  Bridge, x20                  HEP: (6/11/2024) SLR, clam, bridge    Charges: PT Eval Low Complexity, 1 Man (10 min), 1 TherEx (15 min)      Total Timed Treatment: 25 min     Total Treatment Time: 38 min     PLAN OF CARE:    Goals:    (10 visits)  Pt will have no greater than 1/10 pain to ease prolonged standing and amb.  Pt will have 5/5 knee and hip strength to ease amb and transfers.  Pt will be able to negotiate a  flight of stairs for functional mobility.    Frequency / Duration: Patient will be seen for 1-2 x/week or a total of 10 visits over a 90 day period. Treatment will include: Therapeutic Exercise, Manual Therapy, Neuro Re-ed, and Modalities prn    Education or treatment limitation: None  Rehab Potential:good    LEFS Score  LEFS Score: 52.5 % (6/6/2024 12:51 PM)    Patient/Family/Caregiver was advised of these findings, precautions, and treatment options and has agreed to actively participate in planning and for this course of care.    Thank you for your referral. Please co-sign or sign and return this letter via fax as soon as possible to [phone]. If you have any questions, please contact me at Dept: [phone]    Sincerely,  Electronically signed by therapist: Teresa Meyers PT  Physician's certification required: Yes  I certify the need for these services furnished under this plan of treatment and while under my care.    X___________________________________________________ Date____________________    Certification From: 6/11/2024  To:9/9/2024

## 2024-06-18 ENCOUNTER — OFFICE VISIT (OUTPATIENT)
Dept: PHYSICAL THERAPY | Age: 24
End: 2024-06-18
Attending: ORTHOPAEDIC SURGERY
Payer: COMMERCIAL

## 2024-06-18 PROCEDURE — 97140 MANUAL THERAPY 1/> REGIONS: CPT

## 2024-06-18 PROCEDURE — 97110 THERAPEUTIC EXERCISES: CPT

## 2024-06-18 NOTE — PROGRESS NOTES
Diagnosis:   R knee patellar instability Referring Provider: Radha Grajeda  Date of Evaluation:    6/11/2024    Precautions:  None Next MD visit:   none scheduled  Date of Surgery: n/a     Insurance Primary/Secondary: [employer] / N/A     # Auth Visits: 10            Subjective: Pt felt okay after the last visit - he denies recent moments of instability.  Pain: 2/10      Objective: taut lat quad, tracking lateral in end range flexion  Glut med fatigue with    Assessment: Progressed hip and quad strengthening today.  Patellar tracking is improved with flexion.  Plan to progress WB strengthening and stabilization as symptoms allow.    Goals:    (10 visits)  Pt will have no greater than 1/10 pain to ease prolonged standing and amb.  Pt will have 5/5 knee and hip strength to ease amb and transfers.  Pt will be able to negotiate a flight of stairs for functional mobility.    Plan: next visit: update HEP, step back, lunge  Date: 6/11/2024  Tx#:   1 6/18/2024  Tx#: 2   STM - distal ITB, lat quad  Lat patellar lift knee ext, medial glide and lat lift with knee flexion, Gr III STM - distal ITB, lat quad  Lat patellar lift knee ext, medial glide and lat lift with knee flexion, Gr III   SLR, x20  Clam, R/L, x20, red  Bridge, x20 SLR, ER, x20  SB bridge, x20  S/l abd SLR, x20    Side stepping, x4, 10 ft, red  Hip ext, 2x10, red  Rockerboard, AP, ML, taps, x20    Cable walkout, back, x10, 35#  TKE cable, x20, 35#  Side step up, lat, 4 inch step up               HEP: (6/11/2024) SLR, clam, bridge  Charges: 1 Man (10 min), 2 TherEx (28 min)       Total Timed Treatment: 38 min  Total Treatment Time: 38 min

## 2024-06-20 ENCOUNTER — APPOINTMENT (OUTPATIENT)
Dept: PHYSICAL THERAPY | Age: 24
End: 2024-06-20
Attending: ORTHOPAEDIC SURGERY
Payer: COMMERCIAL

## 2024-06-25 ENCOUNTER — OFFICE VISIT (OUTPATIENT)
Dept: PHYSICAL THERAPY | Age: 24
End: 2024-06-25
Attending: ORTHOPAEDIC SURGERY
Payer: COMMERCIAL

## 2024-06-25 PROCEDURE — 97140 MANUAL THERAPY 1/> REGIONS: CPT

## 2024-06-25 PROCEDURE — 97110 THERAPEUTIC EXERCISES: CPT

## 2024-06-25 NOTE — PROGRESS NOTES
Diagnosis:   R knee patellar instability Referring Provider: Radha Grajeda  Date of Evaluation:    6/11/2024    Precautions:  None Next MD visit:   none scheduled  Date of Surgery: n/a     Insurance Primary/Secondary: [employer] / N/A     # Auth Visits: 10            Subjective: Pt reports his knee is improving a little - he still notices pain with descending stairs.  Pain: 2/10    Objective: taut lat quad, tracking lateral in end range flexion  Glut med fatigue   6 inch step down on R, pain    Assessment: Educated pt on taping knee and discussed differences between KT tape and Leukotape for patellar taping.  Updated HEP for WB strengthening today as well.  Pt had pain with eccentric loading in approximately 50 degrees of knee flexion.   Plan to continue strengthening to avoid lateral patellar tracking and patellar instability.     Goals:    (10 visits)  Pt will have no greater than 1/10 pain to ease prolonged standing and amb.  Pt will have 5/5 knee and hip strength to ease amb and transfers.  Pt will be able to negotiate a flight of stairs for functional mobility.    Plan: next visit: SLS fwd reach, SB wall squat  Date: 6/11/2024  Tx#:   1 6/18/2024  Tx#: 2 6/25/2024  Tx#: 3   STM - distal ITB, lat quad  Lat patellar lift knee ext, medial glide and lat lift with knee flexion, Gr III STM - distal ITB, lat quad  Lat patellar lift knee ext, medial glide and lat lift with knee flexion, Gr III STM - distal ITB, lat quad  Lat patellar lift knee ext, medial glide and lat lift with knee flexion, Gr III   SLR, x20  Clam, R/L, x20, red  Bridge, x20 SLR, ER, x20  SB bridge, x20  S/l abd SLR, x20 SLR, ER, x20    Side stepping, x4, 10 ft, red  Hip ext, 2x10, red  Rockerboard, AP, ML, taps, x20 Side stepping, x4, 10 ft, green  Zig zag, x3, 10 ft, green  rockerboard, AP, ML, taps, x20    Cable walkout, back, x10, 35#  TKE cable, x20, 35#  Side step up, lat, 4 inch step up Cable walkout, back, x10, 35#, low incline  TKE cable, x20,  35#     Step back, x20, 4 inch  Step up lat, x20, 6 inch  BOSU, lunge, R/L x20     Medial patellar taping, instruction and trial with KT tape        HEP: (6/11/2024) SLR, clam, bridge  (6/25/2024) SLR ER, side step, zig zag, lat step up, taping info    Charges: 1 Man (10 min), 2 TherEx (30 min)       Total Timed Treatment: 40 min  Total Treatment Time: 40 min

## 2024-06-27 ENCOUNTER — TELEPHONE (OUTPATIENT)
Dept: PHYSICAL THERAPY | Facility: HOSPITAL | Age: 24
End: 2024-06-27

## 2024-06-27 ENCOUNTER — APPOINTMENT (OUTPATIENT)
Dept: PHYSICAL THERAPY | Age: 24
End: 2024-06-27
Attending: ORTHOPAEDIC SURGERY
Payer: COMMERCIAL

## 2024-07-02 ENCOUNTER — OFFICE VISIT (OUTPATIENT)
Dept: PHYSICAL THERAPY | Age: 24
End: 2024-07-02
Attending: ORTHOPAEDIC SURGERY
Payer: COMMERCIAL

## 2024-07-02 PROCEDURE — 97110 THERAPEUTIC EXERCISES: CPT

## 2024-07-02 PROCEDURE — 97140 MANUAL THERAPY 1/> REGIONS: CPT

## 2024-07-02 NOTE — PROGRESS NOTES
Diagnosis:   R knee patellar instability Referring Provider: Radha Grajeda  Date of Evaluation:    6/11/2024    Precautions:  None Next MD visit:   none scheduled  Date of Surgery: n/a     Insurance Primary/Secondary: [employer] / N/A     # Auth Visits: 10            Subjective: Pt notes intermittent symptoms - he still notices soreness with descending stairs.  Pain: 2/10    Objective:   Prone: quad stretch:  WNL - lateral non painful crepitus.  Reduced with ITB STM and patellar tracking     Assessment:  Eccentric quad activities are have increased symptoms with increased depth of knee flexion.  Pt would benefit from continued quad stabilization to continue toward his goals.    Goals:    (10 visits)  Pt will have no greater than 1/10 pain to ease prolonged standing and amb.  Pt will have 5/5 knee and hip strength to ease amb and transfers.  Pt will be able to negotiate a flight of stairs for functional mobility.    Plan: next visit: wall sit, SLS ball toss  Date: 6/11/2024  Tx#:   1 6/18/2024  Tx#: 2 6/25/2024  Tx#: 3 7/2/2024  Tx#: 4   STM - distal ITB, lat quad  Lat patellar lift knee ext, medial glide and lat lift with knee flexion, Gr III STM - distal ITB, lat quad  Lat patellar lift knee ext, medial glide and lat lift with knee flexion, Gr III STM - distal ITB, lat quad  Lat patellar lift knee ext, medial glide and lat lift with knee flexion, Gr III STM - distal ITB, lat quad  Lat patellar lift knee ext, medial glide and lat lift with knee flexion, Gr III   SLR, x20  Clam, R/L, x20, red  Bridge, x20 SLR, ER, x20  SB bridge, x20  S/l abd SLR, x20 SLR, ER, x20 SLR, circles, cw, ccw, x15, 0#  Prone ham curl, x15    Side stepping, x4, 10 ft, red  Hip ext, 2x10, red  Rockerboard, AP, ML, taps, x20 Side stepping, x4, 10 ft, green  Zig zag, x3, 10 ft, green  rockerboard, AP, ML, taps, x20 Lat step down, 4 inch (25% dip), x20  Zig zag, x3, 10 ft, green    Cable walkout, back, x10, 35#  TKE cable, x20, 35#  Side step up,  lat, 4 inch step up Cable walkout, back, x10, 35#, low incline  TKE cable, x20, 35# SLS fwd reach, x15     Step back, x20, 4 inch  Step up lat, x20, 6 inch  BOSU, lunge, R/L x20 BOSU lunge, R/L, x20  BOSU taps, AP, ML, x20  BOSU squat, x20     Medial patellar taping, instruction and trial with KT tape Cable lateral walkout, R/L, x10 25#         HEP: (6/11/2024) SLR, clam, bridge  (6/25/2024) SLR ER, side step, zig zag, lat step up, taping info    Charges: 1 Man (10 min), 2 TherEx (30 min)       Total Timed Treatment: 40 min  Total Treatment Time: 40 min

## 2024-07-09 ENCOUNTER — OFFICE VISIT (OUTPATIENT)
Dept: PHYSICAL THERAPY | Age: 24
End: 2024-07-09
Attending: ORTHOPAEDIC SURGERY
Payer: COMMERCIAL

## 2024-07-09 ENCOUNTER — PATIENT MESSAGE (OUTPATIENT)
Dept: INTERNAL MEDICINE CLINIC | Facility: CLINIC | Age: 24
End: 2024-07-09

## 2024-07-09 PROCEDURE — 97140 MANUAL THERAPY 1/> REGIONS: CPT

## 2024-07-09 PROCEDURE — 97110 THERAPEUTIC EXERCISES: CPT

## 2024-07-09 NOTE — PROGRESS NOTES
Diagnosis:   R knee patellar instability Referring Provider: Radha Grajeda  Date of Evaluation:    6/11/2024    Precautions:  None Next MD visit:   none scheduled  Date of Surgery: n/a     Insurance Primary/Secondary: [employer] / N/A     # Auth Visits: 10            Subjective: Pt ran up the stairs, and he noticed sharp patellar pain.  Pain: 2/10    Objective:   Eccentric quad activities, increase patellar tendon pain      Assessment: Pt has improved patellar tracking with flexion and does not have apprehension with stability with SLS activities.  Pt would benefit from continued PT to progress eccentric and plyometric strengthening to continue toward his strength and pain goals.    Goals:    (10 visits)  Pt will have no greater than 1/10 pain to ease prolonged standing and amb.  Pt will have 5/5 knee and hip strength to ease amb and transfers.  Pt will be able to negotiate a flight of stairs for functional mobility.    Plan: next visit:  6/18/2024  Tx#: 2 6/25/2024  Tx#: 3 7/2/2024  Tx#: 4 7/9/2024  Tx#: 5   STM - distal ITB, lat quad  Lat patellar lift knee ext, medial glide and lat lift with knee flexion, Gr III STM - distal ITB, lat quad  Lat patellar lift knee ext, medial glide and lat lift with knee flexion, Gr III STM - distal ITB, lat quad  Lat patellar lift knee ext, medial glide and lat lift with knee flexion, Gr III STM - distal ITB, lat quad  Lat patellar lift knee ext, medial glide and lat lift with knee flexion, Gr III   SLR, ER, x20  SB bridge, x20  S/l abd SLR, x20 SLR, ER, x20 SLR, circles, cw, ccw, x15, 0#  Prone ham curl, x15 SLR, circles, cw, ccw, x20, 1#  Bridge with SLR, R/L, x15   Side stepping, x4, 10 ft, red  Hip ext, 2x10, red  Rockerboard, AP, ML, taps, x20 Side stepping, x4, 10 ft, green  Zig zag, x3, 10 ft, green  rockerboard, AP, ML, taps, x20 Lat step down, 4 inch (25% dip), x20  Zig zag, x3, 10 ft, green lat step down, 4 inch, x20  Wall sit, x3, 15 sec     Cable walkout, back, x10,  35#  TKE cable, x20, 35#  Side step up, lat, 4 inch step up Cable walkout, back, x10, 35#, low incline  TKE cable, x20, 35# SLS fwd reach, x15 R SLS, ball toss, x20 red, R Rot with foot planted 45 deg toward  SLS forward reach to chair    Step back, x20, 4 inch  Step up lat, x20, 6 inch  BOSU, lunge, R/L x20 BOSU lunge, R/L, x20  BOSU taps, AP, ML, x20  BOSU squat, x20 BOSU lunge, R/L, x20  BOSU taps, AP, ML, x20  BOSU squat, x20    Medial patellar taping, instruction and trial with KT tape Cable lateral walkout, R/L, x10 25# Cable lateral walkout, R/L, x10 30#  Backward step up, x15, 4 inch         HEP: (6/11/2024) SLR, clam, bridge  (6/25/2024) SLR ER, side step, zig zag, lat step up, taping info  (7/9/2024) wall sit, side stepping with squat, fwd reach, lat step down    Charges: 1 Man (10 min), 2 TherEx (30 min)       Total Timed Treatment: 40 min  Total Treatment Time: 40 min

## 2024-07-19 NOTE — TELEPHONE ENCOUNTER
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 Or if you know of other PAs who have availability?    If so, do you have availability 7/16, 7/18, 7/30 or 8/1?    Thank you!    Best,   Alli Mercado

## 2024-07-23 ENCOUNTER — APPOINTMENT (OUTPATIENT)
Dept: PHYSICAL THERAPY | Age: 24
End: 2024-07-23
Attending: ORTHOPAEDIC SURGERY
Payer: COMMERCIAL

## 2024-07-27 DIAGNOSIS — Z87.890 TRANSGENDER WITH HISTORY OF GENDER AFFIRMATION SURGERY: ICD-10-CM

## 2024-07-29 NOTE — TELEPHONE ENCOUNTER
LOV: 3/25    RTC:    FU:    Last Refill: 10/18    RN phoned pharmacy and confirmed that original Rx was     Pended refill and routed for review.

## 2024-07-30 ENCOUNTER — OFFICE VISIT (OUTPATIENT)
Dept: PHYSICAL THERAPY | Age: 24
End: 2024-07-30
Attending: ORTHOPAEDIC SURGERY
Payer: COMMERCIAL

## 2024-07-30 PROCEDURE — 97110 THERAPEUTIC EXERCISES: CPT

## 2024-07-30 PROCEDURE — 97140 MANUAL THERAPY 1/> REGIONS: CPT

## 2024-07-30 RX ORDER — TESTOSTERONE CYPIONATE 200 MG/ML
INJECTION, SOLUTION INTRAMUSCULAR
Qty: 10 ML | Refills: 0 | Status: SHIPPED | OUTPATIENT
Start: 2024-07-30 | End: 2024-08-05

## 2024-07-30 NOTE — PROGRESS NOTES
Diagnosis:   R knee patellar instability Referring Provider: Radha Grajeda  Date of Evaluation:    6/11/2024    Precautions:  None Next MD visit:   none scheduled  Date of Surgery: n/a     Insurance Primary/Secondary: [employer] / N/A     # Auth Visits: 10            Subjective: Pt overall is feeling better.  He is still not at his PLOF.  Pain: 2/10    Objective:   No pain with eccentric step downs  PWB and WB double leg plyometrics - fatigue but no pain    Assessment: Pt is progressing toward his goals as eccentric strength is improving.  Pt did not have pain with PWB and WB plyometrics today.  Educated pt on return to running and plyometric progression to add to HEP.    Goals:    (10 visits)  Pt will have no greater than 1/10 pain to ease prolonged standing and amb.  Pt will have 5/5 knee and hip strength to ease amb and transfers.  Pt will be able to negotiate a flight of stairs for functional mobility.    Plan: next visit: assess POC  6/25/2024  Tx#: 3 7/2/2024  Tx#: 4 7/9/2024  Tx#: 5 7/30/2024  Tx#: 6   STM - distal ITB, lat quad  Lat patellar lift knee ext, medial glide and lat lift with knee flexion, Gr III STM - distal ITB, lat quad  Lat patellar lift knee ext, medial glide and lat lift with knee flexion, Gr III STM - distal ITB, lat quad  Lat patellar lift knee ext, medial glide and lat lift with knee flexion, Gr III STM - distal ITB, lat quad  Lat patellar lift knee ext, medial glide and lat lift with knee flexion, Gr III   SLR, ER, x20 SLR, circles, cw, ccw, x15, 0#  Prone ham curl, x15 SLR, circles, cw, ccw, x20, 1#  Bridge with SLR, R/L, x15 SLR, circles, x20, 2#  Bridge with SLR, R/L, x15   Side stepping, x4, 10 ft, green  Zig zag, x3, 10 ft, green  rockerboard, AP, ML, taps, x20 Lat step down, 4 inch (25% dip), x20  Zig zag, x3, 10 ft, green lat step down, 4 inch, x20  Wall sit, x3, 15 sec   lat step down, 6 inch, x20   Cable walkout, back, x10, 35#, low incline  TKE cable, x20, 35# SLS fwd reach, x15 R  SLS, ball toss, x20 red, R Rot with foot planted 45 deg toward  SLS forward reach to chair Plyometric: double leg fwd/back, R/L, x20   Step back, x20, 4 inch  Step up lat, x20, 6 inch  BOSU, lunge, R/L x20 BOSU lunge, R/L, x20  BOSU taps, AP, ML, x20  BOSU squat, x20 BOSU lunge, R/L, x20  BOSU taps, AP, ML, x20  BOSU squat, x20 BOSU taps, AP, ML, x20  BOSU squat, x20  BOSU lunge, R/L, x20   Medial patellar taping, instruction and trial with KT tape Cable lateral walkout, R/L, x10 25# Cable lateral walkout, R/L, x10 30#  Backward step up, x15, 4 inch Cable lateral walkout, R/L, x10 30#      Shuttle: leg press: DL: x20, 6c, SL: x20, 4c  2x20 bounding, 4c   HEP: (6/11/2024) SLR, clam, bridge  (6/25/2024) SLR ER, side step, zig zag, lat step up, taping info  (7/9/2024) wall sit, side stepping with squat, fwd reach, lat step down (7/30/2024) DL fwd/back and R/L mini jump, education of progression: SL mini jumps, lunge jumps, progression to return to running    Charges: 1 Man (10 min), 2 TherEx (30 min)       Total Timed Treatment: 40 min  Total Treatment Time: 40 min

## 2024-07-31 NOTE — TELEPHONE ENCOUNTER
Refill passed per [facility] protocol.    Mildred is listed as patient reported.    Requested Prescriptions   Pending Prescriptions Disp Refills    Needle, Disp, 18G X 1-1/2\" Does not apply Misc  0       Diabetic Supplies Protocol Passed - 7/27/2024 10:19 PM        Passed - In person appointment or virtual visit in the past 12 mos or appointment in next 3 mos     Recent Outpatient Visits              Yesterday     [facility] [place] Teresa Davidson, PT    Office Visit    3 weeks ago     [facility] [place] Teresa Davidson, PT    Office Visit    4 weeks ago     [facility] [place] Teresa Davidson, PT    Office Visit    1 month ago     [facility] [place] Teresa Davidson, PT    Office Visit    1 month ago     [facility] [place] Teresa Davidson, PT    Office Visit          Future Appointments         Provider Department Appt Notes    In 2 weeks Teresa Meyers, PT [facility] [place] [facility] [employer]  $40 c/p 90 visit limit combined no auth    In 3 weeks Teresa Meyers, PT [facility] [place] [facility] [employer]  $40 c/p 90 visit limit combined no auth    In 3 weeks Teresa Meyers, PT [facility] [place] [facility] [employer]  $40 c/p 90 visit limit combined no auth    In 1 month Twyla Sylvester,  [facility], [facility] FU 6 months / Testosterone / Thyroid follow up  LOV 3/25/24                       Recent Outpatient Visits              Yesterday     [facility] [place] Teresa Davidson, PT    Office Visit    3 weeks ago     [facility] [place] Teresa Davidson, PT    Office Visit    4 weeks ago     [facility] [place] Teresa Davidson, PT    Office Visit    1 month ago     [facility] [place] Teresa Davidson, PT    Office Visit    1 month ago     [facility]  Teresa Meyers, PT    Office Visit          Future Appointments         Provider Department Appt Notes    In 2 weeks Teresa Meyers, PT [facility] [place] [employer]  $40 c/p 90 visit limit combined no auth    In 3 weeks Teresa Meyers, PT [facility] [place] [employer]  $40 c/p 90 visit limit combined no auth    In 3 weeks Teresa Meyers, PT [facility] [place] [employer]  $40 c/p 90 visit limit combined no auth    In 1 month Twyla Sylvester DO [facility], [facility] FU 6 months / Testosterone / Thyroid follow up  LOV 3/25/24

## 2024-08-03 DIAGNOSIS — Z87.890 TRANSGENDER WITH HISTORY OF GENDER AFFIRMATION SURGERY: ICD-10-CM

## 2024-08-05 RX ORDER — TESTOSTERONE CYPIONATE 200 MG/ML
INJECTION, SOLUTION INTRAMUSCULAR
Qty: 10 ML | Refills: 0 | Status: SHIPPED | OUTPATIENT
Start: 2024-08-05

## 2024-08-15 ENCOUNTER — APPOINTMENT (OUTPATIENT)
Dept: PHYSICAL THERAPY | Age: 24
End: 2024-08-15
Attending: ORTHOPAEDIC SURGERY
Payer: COMMERCIAL

## 2024-08-20 ENCOUNTER — OFFICE VISIT (OUTPATIENT)
Dept: PHYSICAL THERAPY | Age: 24
End: 2024-08-20
Attending: ORTHOPAEDIC SURGERY
Payer: COMMERCIAL

## 2024-08-20 DIAGNOSIS — E06.3 HASHIMOTO'S THYROIDITIS: ICD-10-CM

## 2024-08-20 PROCEDURE — 97110 THERAPEUTIC EXERCISES: CPT

## 2024-08-20 PROCEDURE — 97140 MANUAL THERAPY 1/> REGIONS: CPT

## 2024-08-20 NOTE — PROGRESS NOTES
Diagnosis:   R knee patellar instability Referring Provider: Radha Grajeda  Date of Evaluation:    6/11/2024    Precautions:  None Next MD visit:   none scheduled  Date of Surgery: n/a     Insurance Primary/Secondary: [employer] / N/A     # Auth Visits: 10             Discharge Summary  Pt has attended 7 visits in Physical Therapy.     Subjective:  Pt notes he has been feeling better - he has no pain with stairs and has tried light jogging without issues.  Pain: 0/10    Objective:   No pain with eccentric step downs  PWB and WB double leg plyometrics - fatigue but no pain  Observation: central tracking of the patella.  Very slight lateral patellar tilt at end range flexion  Palpation: no TTP  Sensation: intact to gross touch         A/PROM Strength    R L R L   Hip WNL WNL       Flexion   5 5     Extension   5 5     Abduction   5 5   Knee WNL WNL       Flexion   5 5     Extension   5 5   Foot / Ankle WNL WNL     * indicates pain  Accessory motion: hypermobility of patella    Flexibility:  Hip Flexor: WNL  Hamstrings: WNL    Assessment:  Pt has been treated in PT for knee pain secondary to patella instability.  Pt currently presents with improved patellar tracking, improved ITB flexibility, increased knee stabilization strength, and good eccentric strength for light plyometrics.  Discussed POC, and we are in agreement to d/c with HEP to continue toward her goals.    Goals:  MET  (10 visits)  Pt will have no greater than 1/10 pain to ease prolonged standing and amb.  Pt will have 5/5 knee and hip strength to ease amb and transfers.  Pt will be able to negotiate a flight of stairs for functional mobility.    LEFS Score  LEFS Score: 52.5 % (6/6/2024 12:51 PM)    Post LEFS Score  Post LEFS Score: 85 % (8/20/2024  5:52 PM)    32.5 % improvement    Plan: d/c with HEP      Patient/Family/Caregiver was advised of these findings, precautions, and treatment options and has agreed to actively participate in planning and for this  course of care.    Thank you for your referral. If you have any questions, please contact me at Dept: [phone].    Sincerely,  Electronically signed by therapist: Teresa Meyers PT     Physician's certification required:  No    7/2/2024  Tx#: 4 7/9/2024  Tx#: 5 7/30/2024  Tx#: 6 8/20/2024  Tx#: 7   STM - distal ITB, lat quad  Lat patellar lift knee ext, medial glide and lat lift with knee flexion, Gr III STM - distal ITB, lat quad  Lat patellar lift knee ext, medial glide and lat lift with knee flexion, Gr III STM - distal ITB, lat quad  Lat patellar lift knee ext, medial glide and lat lift with knee flexion, Gr III STM - distal ITB, lat quad  Lat patellar lift knee ext, medial glide and lat lift with knee flexion, Gr III   SLR, circles, cw, ccw, x15, 0#  Prone ham curl, x15 SLR, circles, cw, ccw, x20, 1#  Bridge with SLR, R/L, x15 SLR, circles, x20, 2#  Bridge with SLR, R/L, x15 Bridge with SLR, R/L, x15   Lat step down, 4 inch (25% dip), x20  Zig zag, x3, 10 ft, green lat step down, 4 inch, x20  Wall sit, x3, 15 sec   lat step down, 6 inch, x20 Fwd and lat step down, 6 inch, x15   SLS fwd reach, x15 R SLS, ball toss, x20 red, R Rot with foot planted 45 deg toward  SLS forward reach to chair Plyometric: double leg fwd/back, R/L, x20 Lunge jump, x10  Hop scotch, x10  DL box jump, x10, cw, ccw   BOSU lunge, R/L, x20  BOSU taps, AP, ML, x20  BOSU squat, x20 BOSU lunge, R/L, x20  BOSU taps, AP, ML, x20  BOSU squat, x20 BOSU taps, AP, ML, x20  BOSU squat, x20  BOSU lunge, R/L, x20 BOSU taps, AP, ML, x20  BOSU squat, x20  BOSU lunge, R/L, x20   Cable lateral walkout, R/L, x10 25# Cable lateral walkout, R/L, x10 30#  Backward step up, x15, 4 inch Cable lateral walkout, R/L, x10 30# Cable lateral walkout, R/L, x10 30#     Shuttle: leg press: DL: x20, 6c, SL: x20, 4c  2x20 bounding, 4c Shuttle: leg press: DL: x20, 7c, SL: x20, 5c  R SLS, ball toss, x20 red, R Rot with foot planted 45 deg toward   HEP: (6/11/2024) SLR,  clam, bridge  (6/25/2024) SLR ER, side step, zig zag, lat step up, taping info  (7/9/2024) wall sit, side stepping with squat, fwd reach, lat step down (7/30/2024) DL fwd/back and R/L mini jump, education of progression: SL mini jumps, lunge jumps, progression to return to running    Charges: 1 Man (10 min), 2 TherEx (30 min)       Total Timed Treatment: 40 min  Total Treatment Time: 40 min

## 2024-08-21 RX ORDER — LEVOTHYROXINE SODIUM 75 UG/1
75 TABLET ORAL
Qty: 90 TABLET | Refills: 0 | Status: SHIPPED | OUTPATIENT
Start: 2024-08-21

## 2024-08-21 NOTE — TELEPHONE ENCOUNTER
Endocrine refill protocol for medications for hypothyroidism and hyperthyroidism    Protocol Criteria:  PASSED  Appointment with Endocrinology completed in the last 12 months or scheduled in the next 6 months     Verify appointment has been completed or scheduled in the appropriate timeline. If so can send a 90 day supply with 1 refill per provider protocol.    Normal TSH result in the past 12 months   Review recent telephone encounters and mychart communications with patient to ensure a dose change has not occurred since last office visit that was not updated in the medication history list   Last completed office visit:3/25/2024 Twyla Sylvester DO   Next scheduled Follow up: 9/25 with Dr. Sylvester     Last TSH result:   TSH   Date Value Ref Range Status   02/07/2024 0.601 0.358 - 3.740 mIU/mL Final     Comment:     This test may exhibit interference when a sample is collected from a person who is consuming high dose of biotin (a.k.a., vitamin B7, vitamin H, coenzyme R) supplements resulting in serum concentrations >100 ng/mL.  Intake of the recommended daily allowance (RDA) for biotin (0.03 mg) has not been shown to typically cause significant interference; however, high dose daily dietary supplements may contain biotin concentrations greater than 150 times (5-10 mg) the RDA.  It is recommended that physicians ask all patients who may be on biotin supplementation to stop biotin consumption at least 72 hours prior to collection of a new sample.         Ordered per protocol

## 2024-08-22 ENCOUNTER — APPOINTMENT (OUTPATIENT)
Dept: PHYSICAL THERAPY | Age: 24
End: 2024-08-22
Attending: ORTHOPAEDIC SURGERY
Payer: COMMERCIAL

## 2024-08-27 ENCOUNTER — APPOINTMENT (OUTPATIENT)
Dept: PHYSICAL THERAPY | Age: 24
End: 2024-08-27
Attending: ORTHOPAEDIC SURGERY
Payer: COMMERCIAL

## 2024-09-12 ENCOUNTER — LAB ENCOUNTER (OUTPATIENT)
Dept: LAB | Age: 24
End: 2024-09-12
Attending: STUDENT IN AN ORGANIZED HEALTH CARE EDUCATION/TRAINING PROGRAM
Payer: COMMERCIAL

## 2024-09-19 ENCOUNTER — LAB ENCOUNTER (OUTPATIENT)
Dept: LAB | Age: 24
End: 2024-09-19
Attending: STUDENT IN AN ORGANIZED HEALTH CARE EDUCATION/TRAINING PROGRAM
Payer: COMMERCIAL

## 2024-09-19 DIAGNOSIS — Z87.890 TRANSGENDER WITH HISTORY OF GENDER AFFIRMATION SURGERY: ICD-10-CM

## 2024-09-19 DIAGNOSIS — E06.3 HASHIMOTO'S DISEASE: ICD-10-CM

## 2024-09-19 LAB
CHOLEST SERPL-MCNC: 137 MG/DL (ref ?–200)
ERYTHROCYTE [DISTWIDTH] IN BLOOD BY AUTOMATED COUNT: 12 %
FASTING PATIENT LIPID ANSWER: YES
HCT VFR BLD AUTO: 44.3 %
HDLC SERPL-MCNC: 42 MG/DL (ref 40–59)
HGB BLD-MCNC: 15.2 G/DL
LDLC SERPL CALC-MCNC: 85 MG/DL (ref ?–100)
MCH RBC QN AUTO: 30.5 PG (ref 26–34)
MCHC RBC AUTO-ENTMCNC: 34.3 G/DL (ref 31–37)
MCV RBC AUTO: 89 FL
NONHDLC SERPL-MCNC: 95 MG/DL (ref ?–130)
PLATELET # BLD AUTO: 261 10(3)UL (ref 150–450)
RBC # BLD AUTO: 4.98 X10(6)UL
T4 FREE SERPL-MCNC: 1.4 NG/DL (ref 0.8–1.7)
TRIGL SERPL-MCNC: 41 MG/DL (ref 30–149)
TSI SER-ACNC: 1.84 MIU/ML (ref 0.55–4.78)
VLDLC SERPL CALC-MCNC: 7 MG/DL (ref 0–30)
WBC # BLD AUTO: 4.5 X10(3) UL (ref 4–11)

## 2024-09-19 PROCEDURE — 36415 COLL VENOUS BLD VENIPUNCTURE: CPT

## 2024-09-19 PROCEDURE — 84402 ASSAY OF FREE TESTOSTERONE: CPT

## 2024-09-19 PROCEDURE — 80061 LIPID PANEL: CPT

## 2024-09-19 PROCEDURE — 84443 ASSAY THYROID STIM HORMONE: CPT

## 2024-09-19 PROCEDURE — 84439 ASSAY OF FREE THYROXINE: CPT

## 2024-09-19 PROCEDURE — 85027 COMPLETE CBC AUTOMATED: CPT

## 2024-09-19 PROCEDURE — 84403 ASSAY OF TOTAL TESTOSTERONE: CPT

## 2024-09-23 LAB
FREE TESTOST DIRECT: 14.9 PG/ML
TESTOSTERONE: 566 NG/DL

## 2024-09-25 ENCOUNTER — TELEMEDICINE (OUTPATIENT)
Facility: CLINIC | Age: 24
End: 2024-09-25
Payer: COMMERCIAL

## 2024-09-25 DIAGNOSIS — E06.3 HASHIMOTO'S DISEASE: Primary | ICD-10-CM

## 2024-09-25 DIAGNOSIS — Z87.890 TRANSGENDER WITH HISTORY OF GENDER AFFIRMATION SURGERY: ICD-10-CM

## 2024-09-25 PROCEDURE — 99214 OFFICE O/P EST MOD 30 MIN: CPT | Performed by: STUDENT IN AN ORGANIZED HEALTH CARE EDUCATION/TRAINING PROGRAM

## 2024-09-25 NOTE — PROGRESS NOTES
Endocrinology Clinic Telehealth Note    Patient verbally consents to a Video/Telephone service for this visit.  Patient understands and accepts financial responsibility for any deductible, co-insurance and/or co-pays associated with this service.      Name: Valeriy Mercado - preferred name \"Alli\", pronouns he/him    Date: 9/25/24      HISTORY OF PRESENT ILLNESS   Valeriy Mercado is a 24 year old gender male (female to male) status post bilateral mastectomy for gender affirming surgery and currently on testosterone hormonal therapy, generalized anxiety disorder presenting to [facility] care for gender affirmation. Patient reports that he started testosterone therapy in March 2017, was previously treated at [facility] where he used to live. Reports that he underwent top surgery in June 2018 with no complications. He has been on weekly testosterone cypionate intramuscular injections since the start of his therapy, initially was on 0.35/week and switch to 0.3/week approximately 1 year ago due to worsening hair loss and thinning. Per review, the patient has had elevated testosterone levels in the past as well. Reports that he is interested in bottom surgery at some point but would like to discuss this in the future. 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 No shortness of breath, signs or symptoms of DVT. No skin reactions to testosterone injections. Reports that he generally takes his testosterone on schedule every Sunday, but occasionally misses injections and takes them the next day. Reports he is overall satisfied with his appearance and presentation. No other new complaints.    Interval History 3/25/24:  -Started on LT4 after findings of hypothyroidism + positive antithyroid antibodies, does not feel different with taking medication but no adverse effects  -Continues to struggle with hair loss, seeing dermatology who recommend starting finasteride therapy    Interval history 9/25/2024:  -No new  complaints, overall doing well on therapy  -Has not yet started finasteride, needs to follow-up with dermatology  -Occasionally struggling to take levothyroxine on empty stomach as he starts very early as an OR tech, overall adherent    PAST MEDICAL HISTORY:   No past medical history on file.    PAST SURGICAL HISTORY:   Past Surgical History:   Procedure Laterality Date    Mastectomy      bilteral    Tonsillectomy         CURRENT MEDICATIONS:    Current Outpatient Medications   Medication Sig Dispense Refill    LEVOTHYROXINE 75 MCG Oral Tab TAKE 1 TABLET BY MOUTH BEFORE BREAKFAST. 90 tablet 0    testosterone cypionate 200 mg/mL Intramuscular Solution INJECT 0.3 ML INTO MUSCLE OR UNDER SKIN ONCE A WEEK 10 mL 0    Needle, Disp, 18G X 1-1/2\" Does not apply Misc Inject 1 Needle into the skin once a week. 100 each 3    escitalopram 10 MG Oral Tab Take 1 tablet (10 mg total) by mouth daily. 90 tablet 3    triamcinolone 0.1 % External Cream       Ketoconazole 1 % External Shampoo Apply 1 Application topically twice a week. 200 mL 3    MULTIPLE VITAMIN IV Take 1 tablet by mouth daily.       Endocrine Medications            LEVOTHYROXINE 75 MCG Oral Tab            ALLERGIES:  No Known Allergies    SOCIAL HISTORY:    Social History     Socioeconomic History    Marital status: Single   Tobacco Use    Smoking status: Never    Smokeless tobacco: Never   Substance and Sexual Activity    Drug use: Not Currently    Sexual activity: Yes       FAMILY HISTORY:   Family History   Problem Relation Age of Onset    Hypertension Mother     Anxiety Mother     Hypertension Father     Hyperlipidemia Father     Depression Father     Other (missing heart valve) Sister     Anxiety Sister     Depression Sister     Migraines Sister     Pacemaker Maternal Grandfather     Hypertension Maternal Grandfather     Melanoma Maternal Grandfather     Pancreatic Cancer Paternal Grandmother          REVIEW OF SYSTEMS:  Ten point review of systems has been  performed and is otherwise negative and/or non-contributory, except as described above.      PHYSICAL EXAM- limited due to telemedicine encounter    Constitutional Not in acute distress  Pulmonary: no wheezing heard, no coughing on the phone. Speaking in full sentences.  Neurological:  Alert and oriented to person, place and time.   Psychiatric: Normal affect, mood and behavior appropriate        DATA:     Ref Range & Units 12/6/22  4:25 PM   Testosterone,  Total  240 - 871 ng/dL 882 High      Ref Range & Units 12/6/22  4:25 PM   Hemoglobin  11.6 - 15.6 g/dL      Ref Range & Units 12/6/22  4:25 PM   Hematocrit  34 - 46 % 45     Ref Range & Units 12/6/22  4:25 PM   TSH  0.35 - 4.94 uIU/mL 1.34      Latest Reference Range & Units 11/01/23 07:50   ANTI-THYROPEROXIDASE <60 U/mL 732 (H)   (H): Data is abnormally high     Latest Reference Range & Units 11/01/23 07:50 02/07/24 14:59 09/19/24 10:40   T4,Free (Direct) 0.8 - 1.7 ng/dL 0.7 (L) 1.2 1.4   TSH 0.550 - 4.780 mIU/mL 5.450 (H) 0.601 1.841   T3 TOTAL 60 - 181 ng/dL 94     T3 FREE 2.40 - 4.20 pg/mL 3.00     (L): Data is abnormally low  (H): Data is abnormally high     Latest Reference Range & Units 11/01/23 07:50 09/19/24 10:40   TESTOSTERONE 264 - 916 ng/dL 592.30 566       ASSESSMENT AND PLAN:    #Transgender with history of gender affirmation surgery  - Transgender male (F to M) s/p bilateral mastectomy in 2017 and on IM testosterone since 2017, currently satisfied with transition/presentation but considering bottom surgery in the future  - Reviewed need for surveillance for complications of testosterone therapy and adverse effects including polycythemia/VTE  - Pt reports hair loss, following with dermatology    - Finasteride therapy recommended (has not started yet), counseled patient that finasteride can counter effects of testosterone and cause a change in physical changes/appearance or serum levels of testosterone. This may require dose adjustment of  testosterone while on finasteride. Otherwise, no major contraindications to concurrent therapy are noted.   - Continue current TRT  - Repeat mid-week testosterone level in 6 months, 8AM and fasting  - Obtain blood counts/surveillance labs in 6 months  - When patient would like to pursue bottom surgery, recommend referral to surgeons experienced with gender-affirming surgery ([facility] center if needed)  - No evidence of HTN, will monitor  - Uterine tissue/cervix present, patient had pap smear performed end of 2023  - Mammograms not required as pt is s/p bilateral mastectomy    #Hashimoto's disease  -Started on LT4 at the end of 2023 for hypothyroid labs and positive TPO Ab, achieved biochemical euthyroidism on 75mcg daily  -Continue current therapy, repeat labs in 6 months    The above plan was discussed in detail with the patient who verbalized understanding and agreement.      Twyla Sylvester DO  [facility]  9/25/2024     Note to patient: The 21 Century Cures Act makes medical notes like these available to patients in the interest of transparency. However, be advised this is a medical document. It is intended as peer to peer communication. It is written in medical language and may contain abbreviations or verbiage that are unfamiliar. It may appear blunt or direct. Medical documents are intended to carry relevant information, facts as evident, and the clinical opinion of the practitioner.

## 2024-10-22 DIAGNOSIS — Z87.890 TRANSGENDER WITH HISTORY OF GENDER AFFIRMATION SURGERY: ICD-10-CM

## 2024-10-22 RX ORDER — TESTOSTERONE CYPIONATE 200 MG/ML
INJECTION, SOLUTION INTRAMUSCULAR
Qty: 4 ML | Refills: 2 | Status: SHIPPED | OUTPATIENT
Start: 2024-10-22

## 2024-10-22 NOTE — TELEPHONE ENCOUNTER
LOV:9/25    RTC: none listed    FU: not scheduled    Last Refill: 9/14    Last office note: - Continue current TRT     Pended and routed for review

## 2024-10-29 ENCOUNTER — TELEPHONE (OUTPATIENT)
Dept: INTERNAL MEDICINE CLINIC | Facility: CLINIC | Age: 24
End: 2024-10-29

## 2024-10-29 ENCOUNTER — LAB ENCOUNTER (OUTPATIENT)
Dept: LAB | Age: 24
End: 2024-10-29
Attending: PHYSICIAN ASSISTANT
Payer: COMMERCIAL

## 2024-10-29 DIAGNOSIS — Z13.228 SCREENING FOR METABOLIC DISORDER: Primary | ICD-10-CM

## 2024-10-29 DIAGNOSIS — Z13.228 SCREENING FOR METABOLIC DISORDER: ICD-10-CM

## 2024-10-29 LAB
ALBUMIN SERPL-MCNC: 5 G/DL (ref 3.2–4.8)
ALBUMIN/GLOB SERPL: 1.4 {RATIO} (ref 1–2)
ALP LIVER SERPL-CCNC: 70 U/L
ALT SERPL-CCNC: 31 U/L
ANION GAP SERPL CALC-SCNC: 9 MMOL/L (ref 0–18)
AST SERPL-CCNC: 28 U/L (ref ?–34)
BILIRUB SERPL-MCNC: 0.9 MG/DL (ref 0.3–1.2)
BUN BLD-MCNC: 16 MG/DL (ref 9–23)
CALCIUM BLD-MCNC: 10.3 MG/DL (ref 8.7–10.4)
CHLORIDE SERPL-SCNC: 102 MMOL/L (ref 98–112)
CO2 SERPL-SCNC: 28 MMOL/L (ref 21–32)
CREAT BLD-MCNC: 1.03 MG/DL
EGFRCR SERPLBLD CKD-EPI 2021: 103 ML/MIN/1.73M2 (ref 60–?)
FASTING STATUS PATIENT QL REPORTED: YES
GLOBULIN PLAS-MCNC: 3.5 G/DL (ref 2–3.5)
GLUCOSE BLD-MCNC: 85 MG/DL (ref 70–99)
OSMOLALITY SERPL CALC.SUM OF ELEC: 288 MOSM/KG (ref 275–295)
POTASSIUM SERPL-SCNC: 4 MMOL/L (ref 3.5–5.1)
PROT SERPL-MCNC: 8.5 G/DL (ref 5.7–8.2)
SODIUM SERPL-SCNC: 139 MMOL/L (ref 136–145)

## 2024-10-29 PROCEDURE — 80053 COMPREHEN METABOLIC PANEL: CPT

## 2024-10-29 PROCEDURE — 36415 COLL VENOUS BLD VENIPUNCTURE: CPT

## 2024-11-05 NOTE — PROGRESS NOTES
Chief Complaint   Patient presents with    Physical     Room # 6 KB    Care Gap Management       Due for flu shot       HPI:  Pt presents for annual physical.  Pt is not exercising regularly.  Watches his diet.    Eczema - has used TAC cream, beneficial.  Sxs worse in winter.    H/O mastectomy but cervix and ovaries are in place.      Review of Systems   Constitutional: Negative for fever, chills and fatigue. No distress.  HENT: Negative for neck pain and dental problem.  Recent URI resolving as expected and anticipated, feeling \"much better\"  Eyes: Negative for pain and visual disturbance.   Respiratory: Negative for chest tightness, shortness of breath and wheezing.  Mild cough from recent URI, improving.  Cardiovascular: Negative for chest pain, palpitations and leg swelling.   Gastrointestinal: Negative for nausea, vomiting, abdominal pain, diarrhea, blood in stool  Genitourinary: Negative for dysuria, hematuria and difficulty urinating.   Musculoskeletal: Negative for myalgias, back pain, joint swelling, arthralgias and gait problem.   Skin: Negative for color change and rash.   Neurological: Negative for dizziness, syncope, weakness, numbness, tingling and headaches.   Hematological: Negative for adenopathy. Does not bruise/bleed easily.   Psychiatric/Behavioral: The patient is not nervous/anxious. No depression.    Patient Active Problem List   Diagnosis    EUGENIA (generalized anxiety disorder)    Family history of congenital heart disease in sister    Transgender with history of gender affirmation surgery    Hashimoto's disease    Encounter for monitoring testosterone replacement therapy    History of mastectomy, bilateral       History reviewed. No pertinent past medical history.  Past Surgical History:   Procedure Laterality Date    Mastectomy      bilteral    Tonsillectomy       Family History   Problem Relation Age of Onset    Hypertension Mother     Anxiety Mother     Hypertension Father     Hyperlipidemia  Father     Depression Father     Other (missing heart valve) Sister     Anxiety Sister     Depression Sister     Migraines Sister     Pacemaker Maternal Grandfather     Hypertension Maternal Grandfather     Melanoma Maternal Grandfather     Pancreatic Cancer Paternal Grandmother      Social History     Socioeconomic History    Marital status: Single   Tobacco Use    Smoking status: Never    Smokeless tobacco: Never   Substance and Sexual Activity    Drug use: Not Currently    Sexual activity: Yes       Current Outpatient Medications   Medication Sig Dispense Refill    triamcinolone 0.1 % External Cream Apply topically daily. 80 g 1    testosterone cypionate 200 mg/mL Intramuscular Solution INJECT 0.3ML IN THE MUSCLE OR UNDER THE SKIN ONCE A WEEK. 4 mL 2    LEVOTHYROXINE 75 MCG Oral Tab TAKE 1 TABLET BY MOUTH BEFORE BREAKFAST. 90 tablet 0    Needle, Disp, 18G X 1-1/2\" Does not apply Misc Inject 1 Needle into the skin once a week. 100 each 3    escitalopram 10 MG Oral Tab Take 1 tablet (10 mg total) by mouth daily. 90 tablet 3       Allergies  Allergies[1]    Health Maintenance  Immunization History   Administered Date(s) Administered    >=3 YRS TRI  MULTIDOSE VIAL (41775) FLU CLINIC 10/01/2009, 10/27/2016, 12/16/2019, 09/22/2020, 10/26/2022    Covid-19 Vaccine Moderna 100 mcg/0.5 ml 01/13/2021, 02/10/2021    Covid-19 Vaccine Pfizer Bivalent 30mcg/0.3mL 12/06/2022    DTAP 04/08/1998, 06/03/1998, 08/12/1998, 05/07/1999, 02/18/2003    FLUZONE 6 months and older PFS 0.5 ml (76129) 12/18/2017, 09/14/2023    HEP A,Ped/Adol,(2 Dose) 04/01/2009, 03/22/2010    HEP B Vaccine 12/06/2022    HEP B, Ped/Adol 02/11/1998, 04/08/1998, 08/12/1998    HIB 04/08/1998, 06/03/1998, 08/12/1998, 05/07/1999    HIB (HbOC) 04/08/1998, 06/03/1998, 08/12/1998, 05/07/1999    HPV (Gardasil) 04/12/2011, 06/14/2011, 10/19/2011    IPV 04/08/1998, 06/03/1998, 08/12/1998, 02/18/2003    Influenza 02/10/2010, 10/19/2011, 01/22/2013, 03/11/2014, 12/16/2019     Influenza Virus Vaccine, H1N1 (CPT=90663), Flu Clinic 02/10/2010    MMR 02/12/1999, 02/18/2003, 02/18/2003    Meningococcal-Menactra 04/01/2009, 03/11/2014    OPV 04/08/1998, 06/03/1998, 08/12/1998, 02/18/2003    TD 01/10/2020    TDAP 04/01/2009, 02/20/2023    Tb Intradermal Test 10/18/2021, 12/06/2022, 01/10/2023    Varicella 02/12/1999, 04/16/2007, 04/12/2011     Dental Visits:  Yes      Physical Exam  /64   Pulse 68   Resp 16   Ht 5' 5\" (1.651 m)   Wt 155 lb (70.3 kg)   SpO2 100%   BMI 25.79 kg/m²   Constitutional: Oriented to person, place, and time. No distress.   HEENT:  Normocephalic and atraumatic. Tympanic membranes normal.  Nose normal. Oropharynx is clear and moist.  3 2-3 mm flesh colored firm masses on superior pinna of R ear  Eyes: Conjunctivae are normal. PERRLA. No scleral icterus.   Neck: Normal range of motion. Neck supple. No carotid bruits bilaterally. No edema present.  Cardiovascular: Normal rate, regular rhythm and intact distal pulses.  No murmur, rubs or gallops.   Pulmonary/Chest: Effort normal and breath sounds normal. No respiratory distress. No wheezes, rhonchi or rales  Abdominal: Soft. Bowel sounds are normal. Non tender, no masses, no organomegaly or hernias.  :  Normal vagina with no external lesions or masses.  No internal masses.  No cervical lesions or masses.  No CMT.  No adnexal fullness or masses bilaterally.  Musculoskeletal: Normal range of motion. No edema and no tenderness.  No effusions.  Lymphadenopathy: No cervical adenopathy.   Neurological: Normal reflexes. No cranial nerve deficit or sensory deficit. Normal muscle tone. Coordination normal.   Skin: Skin is warm and dry. No rash noted. No erythema. No pallor.   Psychiatric: Normal mood and affect.     [facility] Lab Encounter on 10/29/2024   Component Date Value Ref Range Status    Glucose 10/29/2024 85  70 - 99 mg/dL Final    Sodium 10/29/2024 139  136 - 145 mmol/L Final    Potassium 10/29/2024 4.0  3.5 -  5.1 mmol/L Final    Chloride 10/29/2024 102  98 - 112 mmol/L Final    CO2 10/29/2024 28.0  21.0 - 32.0 mmol/L Final    Anion Gap 10/29/2024 9  0 - 18 mmol/L Final    BUN 10/29/2024 16  9 - 23 mg/dL Final    Creatinine 10/29/2024 1.03  Male 0.70-1.30 : Female 0.55-1.02 mg/dL Final    Calcium, Total 10/29/2024 10.3  8.7 - 10.4 mg/dL Final    Calculated Osmolality 10/29/2024 288  275 - 295 mOsm/kg Final    eGFR-Cr 10/29/2024 103  >=60 mL/min/1.73m2 Final    AST 10/29/2024 28  <34 U/L Final    ALT 10/29/2024 31  Male 10-49 : Female 10-49 U/L Final    Alkaline Phosphatase 10/29/2024 70  Male  : Female 37-98 U/L Final    Bilirubin, Total 10/29/2024 0.9  0.3 - 1.2 mg/dL Final    Total Protein 10/29/2024 8.5 (H)  5.7 - 8.2 g/dL Final    Albumin 10/29/2024 5.0 (H)  3.2 - 4.8 g/dL Final    Globulin  10/29/2024 3.5  2.0 - 3.5 g/dL Final    A/G Ratio 10/29/2024 1.4  1.0 - 2.0 Final    Patient Fasting for CMP? 10/29/2024 Yes   Final       A/P:    Encounter Diagnoses   Name Primary?    Annual physical exam - pap smear today.  Will get flu shot through employer, works for [employer].  Declines here today. Yes    EUGENIA (generalized anxiety disorder) - controlled on Lexapro, continue.     Hashimoto's disease - follows with Endo.       Transgender with history of gender affirmation surgery - managed by Endo.     Elevated total protein - hydrate and repeat CMP in a few weeks.       Eczema, unspecified type - refill TAC cream for prn use.     Cervical cancer screening        Orders Placed This Encounter   Procedures    Comp Metabolic Panel (14) [E]    ThinPrep PAP with HPV Reflex Request       Meds & Refills for this Visit:  Requested Prescriptions     Signed Prescriptions Disp Refills    triamcinolone 0.1 % External Cream 80 g 1     Sig: Apply topically daily.       Imaging & Consults:  DERM - INTERNAL      Return in about 1 year (around 11/6/2025) for Annual physical.    There are no Patient Instructions on file for this  visit.    All questions were answered and the patient understands the plan.                  [1] No Known Allergies

## 2024-11-06 ENCOUNTER — OFFICE VISIT (OUTPATIENT)
Dept: INTERNAL MEDICINE CLINIC | Facility: CLINIC | Age: 24
End: 2024-11-06
Payer: COMMERCIAL

## 2024-11-06 VITALS
DIASTOLIC BLOOD PRESSURE: 64 MMHG | HEART RATE: 68 BPM | RESPIRATION RATE: 16 BRPM | BODY MASS INDEX: 25.83 KG/M2 | HEIGHT: 65 IN | WEIGHT: 155 LBS | OXYGEN SATURATION: 100 % | SYSTOLIC BLOOD PRESSURE: 116 MMHG

## 2024-11-06 DIAGNOSIS — Z87.890 TRANSGENDER WITH HISTORY OF GENDER AFFIRMATION SURGERY: ICD-10-CM

## 2024-11-06 DIAGNOSIS — R77.8 ELEVATED TOTAL PROTEIN: ICD-10-CM

## 2024-11-06 DIAGNOSIS — Z00.00 ANNUAL PHYSICAL EXAM: Primary | ICD-10-CM

## 2024-11-06 DIAGNOSIS — H93.8X1 MASS OF RIGHT EAR: ICD-10-CM

## 2024-11-06 DIAGNOSIS — E06.3 HASHIMOTO'S DISEASE: ICD-10-CM

## 2024-11-06 DIAGNOSIS — Z12.4 CERVICAL CANCER SCREENING: ICD-10-CM

## 2024-11-06 DIAGNOSIS — L30.9 ECZEMA, UNSPECIFIED TYPE: ICD-10-CM

## 2024-11-06 DIAGNOSIS — F41.1 GAD (GENERALIZED ANXIETY DISORDER): ICD-10-CM

## 2024-11-06 PROBLEM — Z90.13 HISTORY OF MASTECTOMY, BILATERAL: Status: ACTIVE | Noted: 2024-11-06

## 2024-11-06 PROCEDURE — 99395 PREV VISIT EST AGE 18-39: CPT | Performed by: PHYSICIAN ASSISTANT

## 2024-11-06 PROCEDURE — 88175 CYTOPATH C/V AUTO FLUID REDO: CPT | Performed by: PHYSICIAN ASSISTANT

## 2024-11-06 RX ORDER — TRIAMCINOLONE ACETONIDE 1 MG/G
CREAM TOPICAL DAILY
Qty: 80 G | Refills: 1 | Status: SHIPPED | OUTPATIENT
Start: 2024-11-06

## 2024-11-12 LAB
.: NORMAL
.: NORMAL

## 2024-11-17 DIAGNOSIS — E06.3 HASHIMOTO'S THYROIDITIS: ICD-10-CM

## 2024-11-18 RX ORDER — LEVOTHYROXINE SODIUM 75 UG/1
75 TABLET ORAL
Qty: 90 TABLET | Refills: 1 | Status: SHIPPED | OUTPATIENT
Start: 2024-11-18

## 2024-11-18 NOTE — TELEPHONE ENCOUNTER
Endocrine refill protocol for medications for hypothyroidism and hyperthyroidism    Protocol Criteria:  PASSED Reason: N/A    If all below requirements are met, send a 90-day supply with 1 refill per provider protocol.    Verify appointment with Endocrinology completed in the last 12 months or scheduled in the next 6 months.    Normal TSH result in the past 12 months   Review recent telephone encounters and mychart communications with patient to ensure a dose change has not occurred since last office visit that was not updated in the medication history list     Last completed office visit:9/25/2024 Twyla Sylvester DO   Next scheduled Follow up: No future appointments.   Last TSH result:   TSH   Date Value Ref Range Status   09/19/2024 1.841 0.550 - 4.780 mIU/mL Final     Last office note: -Started on LT4 at the end of 2023 for hypothyroid labs and positive TPO Ab, achieved biochemical euthyroidism on 75mcg daily  -Continue current therapy, repeat labs in 6 months    Passed and ordered per protocol

## 2025-01-20 DIAGNOSIS — Z87.890 TRANSGENDER WITH HISTORY OF GENDER AFFIRMATION SURGERY: ICD-10-CM

## 2025-01-23 RX ORDER — TESTOSTERONE CYPIONATE 200 MG/ML
INJECTION, SOLUTION INTRAMUSCULAR
Qty: 4 ML | Refills: 2 | Status: SHIPPED | OUTPATIENT
Start: 2025-01-23

## 2025-02-04 ENCOUNTER — PATIENT MESSAGE (OUTPATIENT)
Dept: INTERNAL MEDICINE CLINIC | Facility: CLINIC | Age: 25
End: 2025-02-04

## 2025-02-24 RX ORDER — ESCITALOPRAM OXALATE 10 MG/1
10 TABLET ORAL DAILY
Qty: 90 TABLET | Refills: 3 | Status: SHIPPED | OUTPATIENT
Start: 2025-02-24

## 2025-03-11 ENCOUNTER — PATIENT MESSAGE (OUTPATIENT)
Dept: ORTHOPEDICS CLINIC | Facility: CLINIC | Age: 25
End: 2025-03-11

## 2025-03-11 DIAGNOSIS — M25.361 PATELLAR INSTABILITY OF RIGHT KNEE: ICD-10-CM

## 2025-03-12 NOTE — TELEPHONE ENCOUNTER
Request: PT order (pending with 12 session)  LOV: 05/13/24  - Patellar instability of right knee   DOI: 05/4/24    Pt Message: \"..It’s Alli from [facility] OR. I’m wondering if you are able to write me a renewed PT referral. I think I would benefit from a few refresher sessions\"

## 2025-05-12 DIAGNOSIS — Z87.890 TRANSGENDER WITH HISTORY OF GENDER AFFIRMATION SURGERY: ICD-10-CM

## 2025-05-13 RX ORDER — TESTOSTERONE CYPIONATE 200 MG/ML
INJECTION, SOLUTION INTRAMUSCULAR
Qty: 4 ML | Refills: 1 | Status: SHIPPED | OUTPATIENT
Start: 2025-05-13

## 2025-05-13 NOTE — TELEPHONE ENCOUNTER
Controlled substance, routed to the provider.    Last office visit - 09/25/2024  FU - not scheduled  Last refill - 1/23/2025  Last office note - \"Continue current TRT \"

## 2025-05-15 DIAGNOSIS — E06.3 HASHIMOTO'S THYROIDITIS: ICD-10-CM

## 2025-05-16 RX ORDER — LEVOTHYROXINE SODIUM 75 UG/1
75 TABLET ORAL
Qty: 90 TABLET | Refills: 1 | Status: SHIPPED | OUTPATIENT
Start: 2025-05-16

## 2025-05-16 NOTE — TELEPHONE ENCOUNTER
Endocrine refill protocol for medications for hypothyroidism and hyperthyroidism    Protocol Criteria:  PASSED Reason: N/A    If all below requirements are met, send a 90-day supply with 1 refill per provider protocol.    Verify appointment with Endocrinology completed in the last 12 months or scheduled in the next 6 months.    Normal TSH result in the past 12 months   Review recent telephone encounters and mychart communications with patient to ensure a dose change has not occurred since last office visit that was not updated in the medication history list     Last completed office visit:Visit date not found   Last completed telemed visit: 9/25/2024 Twyla Sylvester DO  Next scheduled Follow up: No future appointments.   Last TSH result:   TSH   Date Value Ref Range Status   09/19/2024 1.841 0.550 - 4.780 mIU/mL Final

## 2025-07-20 DIAGNOSIS — Z87.890 TRANSGENDER WITH HISTORY OF GENDER AFFIRMATION SURGERY: ICD-10-CM

## 2025-07-22 RX ORDER — TESTOSTERONE CYPIONATE 200 MG/ML
60 INJECTION, SOLUTION INTRAMUSCULAR
Qty: 3 ML | Refills: 0 | Status: SHIPPED | OUTPATIENT
Start: 2025-07-22 | End: 2025-09-20

## 2025-07-22 NOTE — TELEPHONE ENCOUNTER
Sent 60 days of medication. Needs new [facility] [place] [place] fasting labs and office appointment before further fills, can schedule with one of the covering doctors in September to review labs and provide refills for TRT/LT4. (Can do labs 7-10 days before office appointment)

## 2025-07-22 NOTE — TELEPHONE ENCOUNTER
Endocrine Refill protocol for Testosterone    Protocol Criteria:  PASSED  Reason: N/A    If below requirement is met, send a 90-day supply with 1 refill per provider protocol.    Verify appointment with Endocrinology completed in the last 6 months or scheduled in the next 3 months.    Last completed office visit:Visit date not found   Last completed telemed visit: 9/25/2024 Twyla Sylvester DO  Next scheduled Follow up: No future appointments.

## (undated) NOTE — LETTER
Date: 5/8/2024    Patient Name: Valeriy Mercado          To Whom it may concern:    This letter has been written at the patient's request. The above patient was seen at [facility] for treatment of a medical condition.    This patient should be excused from attending work from 5/8/24 through 5/13/24.    Valeriy is pending an Orthopedic evaluation.        Sincerely,        Karli Manzano PA-C